# Patient Record
Sex: FEMALE | Race: BLACK OR AFRICAN AMERICAN | Employment: OTHER | URBAN - METROPOLITAN AREA
[De-identification: names, ages, dates, MRNs, and addresses within clinical notes are randomized per-mention and may not be internally consistent; named-entity substitution may affect disease eponyms.]

---

## 2017-04-19 ENCOUNTER — HOSPITAL ENCOUNTER (EMERGENCY)
Age: 52
Discharge: HOME | End: 2017-04-20
Attending: EMERGENCY MEDICINE
Payer: MEDICARE

## 2017-04-19 ENCOUNTER — APPOINTMENT (OUTPATIENT)
Dept: GENERAL RADIOLOGY | Age: 52
End: 2017-04-19
Payer: MEDICARE

## 2017-04-19 VITALS
DIASTOLIC BLOOD PRESSURE: 75 MMHG | RESPIRATION RATE: 18 BRPM | OXYGEN SATURATION: 96 % | SYSTOLIC BLOOD PRESSURE: 114 MMHG | HEART RATE: 88 BPM | TEMPERATURE: 97 F

## 2017-04-19 DIAGNOSIS — T50.901A OVERDOSE, ACCIDENTAL OR UNINTENTIONAL, INITIAL ENCOUNTER: Primary | ICD-10-CM

## 2017-04-19 LAB
ABSOLUTE EOS #: 0 K/UL (ref 0–0.4)
ABSOLUTE LYMPH #: 1.9 K/UL (ref 1–4.8)
ABSOLUTE MONO #: 0.4 K/UL (ref 0.1–1.2)
ACETAMINOPHEN LEVEL: <10 UG/ML (ref 10–30)
ANION GAP SERPL CALCULATED.3IONS-SCNC: 15 MMOL/L (ref 9–17)
BASOPHILS # BLD: 1 % (ref 0–2)
BASOPHILS ABSOLUTE: 0.1 K/UL (ref 0–0.2)
BUN BLDV-MCNC: 10 MG/DL (ref 6–20)
BUN/CREAT BLD: NORMAL (ref 9–20)
CALCIUM SERPL-MCNC: 8.7 MG/DL (ref 8.6–10.4)
CHLORIDE BLD-SCNC: 103 MMOL/L (ref 98–107)
CO2: 21 MMOL/L (ref 20–31)
CREAT SERPL-MCNC: 0.89 MG/DL (ref 0.5–0.9)
D-DIMER QUANTITATIVE: 0.34 MG/L FEU
DIFFERENTIAL TYPE: ABNORMAL
EOSINOPHILS RELATIVE PERCENT: 1 % (ref 1–4)
ETHANOL PERCENT: 0.21 %
ETHANOL: 207 MG/DL
GFR AFRICAN AMERICAN: >60 ML/MIN
GFR NON-AFRICAN AMERICAN: >60 ML/MIN
GFR SERPL CREATININE-BSD FRML MDRD: NORMAL ML/MIN/{1.73_M2}
GFR SERPL CREATININE-BSD FRML MDRD: NORMAL ML/MIN/{1.73_M2}
GLUCOSE BLD-MCNC: 95 MG/DL (ref 70–99)
HCT VFR BLD CALC: 37.2 % (ref 36–46)
HEMOGLOBIN: 12.4 G/DL (ref 12–16)
LYMPHOCYTES # BLD: 38 % (ref 24–44)
MAGNESIUM: 2.1 MG/DL (ref 1.6–2.6)
MCH RBC QN AUTO: 27.4 PG (ref 26–34)
MCHC RBC AUTO-ENTMCNC: 33.4 G/DL (ref 31–37)
MCV RBC AUTO: 81.9 FL (ref 80–100)
MONOCYTES # BLD: 8 % (ref 2–11)
PDW BLD-RTO: 16 % (ref 12.5–15.4)
PLATELET # BLD: 169 K/UL (ref 140–450)
PLATELET ESTIMATE: ABNORMAL
PMV BLD AUTO: 8.5 FL (ref 6–12)
POC TROPONIN I: 0.01 NG/ML (ref 0–0.1)
POC TROPONIN INTERP: NORMAL
POTASSIUM SERPL-SCNC: 3.8 MMOL/L (ref 3.7–5.3)
RBC # BLD: 4.54 M/UL (ref 4–5.2)
RBC # BLD: ABNORMAL 10*6/UL
SALICYLATE LEVEL: <1 MG/DL (ref 3–10)
SEG NEUTROPHILS: 52 % (ref 36–66)
SEGMENTED NEUTROPHILS ABSOLUTE COUNT: 2.6 K/UL (ref 1.8–7.7)
SODIUM BLD-SCNC: 139 MMOL/L (ref 135–144)
TOXIC TRICYCLIC SC,BLOOD: NEGATIVE
WBC # BLD: 5 K/UL (ref 3.5–11)
WBC # BLD: ABNORMAL 10*3/UL

## 2017-04-19 PROCEDURE — 84484 ASSAY OF TROPONIN QUANT: CPT

## 2017-04-19 PROCEDURE — 99285 EMERGENCY DEPT VISIT HI MDM: CPT

## 2017-04-19 PROCEDURE — 85379 FIBRIN DEGRADATION QUANT: CPT

## 2017-04-19 PROCEDURE — G0480 DRUG TEST DEF 1-7 CLASSES: HCPCS

## 2017-04-19 PROCEDURE — 6360000002 HC RX W HCPCS: Performed by: EMERGENCY MEDICINE

## 2017-04-19 PROCEDURE — 83735 ASSAY OF MAGNESIUM: CPT

## 2017-04-19 PROCEDURE — 71020 XR CHEST STANDARD TWO VW: CPT

## 2017-04-19 PROCEDURE — 96374 THER/PROPH/DIAG INJ IV PUSH: CPT

## 2017-04-19 PROCEDURE — 93005 ELECTROCARDIOGRAM TRACING: CPT

## 2017-04-19 PROCEDURE — 85025 COMPLETE CBC W/AUTO DIFF WBC: CPT

## 2017-04-19 PROCEDURE — 80307 DRUG TEST PRSMV CHEM ANLYZR: CPT

## 2017-04-19 PROCEDURE — 80048 BASIC METABOLIC PNL TOTAL CA: CPT

## 2017-04-19 RX ORDER — ONDANSETRON 2 MG/ML
4 INJECTION INTRAMUSCULAR; INTRAVENOUS ONCE
Status: COMPLETED | OUTPATIENT
Start: 2017-04-19 | End: 2017-04-19

## 2017-04-19 RX ADMIN — ONDANSETRON 4 MG: 2 INJECTION, SOLUTION INTRAMUSCULAR; INTRAVENOUS at 22:26

## 2017-04-20 LAB
EKG ATRIAL RATE: 95 BPM
EKG P AXIS: 76 DEGREES
EKG P-R INTERVAL: 164 MS
EKG Q-T INTERVAL: 360 MS
EKG QRS DURATION: 86 MS
EKG QTC CALCULATION (BAZETT): 452 MS
EKG R AXIS: 60 DEGREES
EKG T AXIS: 55 DEGREES
EKG VENTRICULAR RATE: 95 BPM
POC TROPONIN I: 0 NG/ML (ref 0–0.1)
POC TROPONIN INTERP: NORMAL

## 2017-04-20 PROCEDURE — 84484 ASSAY OF TROPONIN QUANT: CPT

## 2017-04-20 ASSESSMENT — ENCOUNTER SYMPTOMS
NAUSEA: 0
COUGH: 0
VOMITING: 0
TROUBLE SWALLOWING: 0
SHORTNESS OF BREATH: 0
DIARRHEA: 0
SORE THROAT: 0
COLOR CHANGE: 0
ABDOMINAL PAIN: 0

## 2018-11-18 ENCOUNTER — HOSPITAL ENCOUNTER (EMERGENCY)
Age: 53
Discharge: HOME | End: 2018-11-18
Attending: EMERGENCY MEDICINE
Payer: MEDICAID

## 2018-11-18 ENCOUNTER — APPOINTMENT (OUTPATIENT)
Dept: CT IMAGING | Age: 53
End: 2018-11-18
Payer: MEDICAID

## 2018-11-18 ENCOUNTER — APPOINTMENT (OUTPATIENT)
Dept: GENERAL RADIOLOGY | Age: 53
End: 2018-11-18
Payer: MEDICAID

## 2018-11-18 VITALS
OXYGEN SATURATION: 95 % | BODY MASS INDEX: 25.1 KG/M2 | DIASTOLIC BLOOD PRESSURE: 109 MMHG | RESPIRATION RATE: 20 BRPM | TEMPERATURE: 97.8 F | SYSTOLIC BLOOD PRESSURE: 170 MMHG | HEART RATE: 79 BPM | HEIGHT: 64 IN | WEIGHT: 147 LBS

## 2018-11-18 DIAGNOSIS — S20.211A RIB CONTUSION, RIGHT, INITIAL ENCOUNTER: Primary | ICD-10-CM

## 2018-11-18 PROCEDURE — 6360000002 HC RX W HCPCS: Performed by: STUDENT IN AN ORGANIZED HEALTH CARE EDUCATION/TRAINING PROGRAM

## 2018-11-18 PROCEDURE — 6360000004 HC RX CONTRAST MEDICATION: Performed by: EMERGENCY MEDICINE

## 2018-11-18 PROCEDURE — 99285 EMERGENCY DEPT VISIT HI MDM: CPT

## 2018-11-18 PROCEDURE — 71260 CT THORAX DX C+: CPT

## 2018-11-18 PROCEDURE — 6360000002 HC RX W HCPCS: Performed by: EMERGENCY MEDICINE

## 2018-11-18 PROCEDURE — 96374 THER/PROPH/DIAG INJ IV PUSH: CPT

## 2018-11-18 PROCEDURE — 71046 X-RAY EXAM CHEST 2 VIEWS: CPT

## 2018-11-18 PROCEDURE — 96375 TX/PRO/DX INJ NEW DRUG ADDON: CPT

## 2018-11-18 PROCEDURE — 6370000000 HC RX 637 (ALT 250 FOR IP): Performed by: EMERGENCY MEDICINE

## 2018-11-18 RX ORDER — HYDROCODONE BITARTRATE AND ACETAMINOPHEN 5; 325 MG/1; MG/1
1 TABLET ORAL EVERY 6 HOURS PRN
Qty: 10 TABLET | Refills: 0 | Status: SHIPPED | OUTPATIENT
Start: 2018-11-18 | End: 2018-11-21

## 2018-11-18 RX ORDER — FENTANYL CITRATE 50 UG/ML
50 INJECTION, SOLUTION INTRAMUSCULAR; INTRAVENOUS ONCE
Status: COMPLETED | OUTPATIENT
Start: 2018-11-18 | End: 2018-11-18

## 2018-11-18 RX ORDER — LIDOCAINE 50 MG/G
1 PATCH TOPICAL DAILY
Qty: 30 PATCH | Refills: 0 | Status: SHIPPED | OUTPATIENT
Start: 2018-11-18 | End: 2018-12-18

## 2018-11-18 RX ORDER — KETOROLAC TROMETHAMINE 30 MG/ML
30 INJECTION, SOLUTION INTRAMUSCULAR; INTRAVENOUS ONCE
Status: COMPLETED | OUTPATIENT
Start: 2018-11-18 | End: 2018-11-18

## 2018-11-18 RX ORDER — LIDOCAINE 4 G/G
1 PATCH TOPICAL DAILY
Status: DISCONTINUED | OUTPATIENT
Start: 2018-11-18 | End: 2018-11-18 | Stop reason: HOSPADM

## 2018-11-18 RX ORDER — KETOROLAC TROMETHAMINE 10 MG/1
10 TABLET, FILM COATED ORAL EVERY 6 HOURS PRN
Qty: 20 TABLET | Refills: 0 | Status: SHIPPED | OUTPATIENT
Start: 2018-11-18 | End: 2019-03-26 | Stop reason: ALTCHOICE

## 2018-11-18 RX ADMIN — KETOROLAC TROMETHAMINE 30 MG: 30 INJECTION, SOLUTION INTRAMUSCULAR at 09:26

## 2018-11-18 RX ADMIN — IOPAMIDOL 75 ML: 755 INJECTION, SOLUTION INTRAVENOUS at 07:14

## 2018-11-18 RX ADMIN — FENTANYL CITRATE 50 MCG: 50 INJECTION, SOLUTION INTRAMUSCULAR; INTRAVENOUS at 06:34

## 2018-11-18 ASSESSMENT — PAIN DESCRIPTION - ORIENTATION: ORIENTATION: RIGHT

## 2018-11-18 ASSESSMENT — ENCOUNTER SYMPTOMS
COLOR CHANGE: 0
SHORTNESS OF BREATH: 1
NAUSEA: 0
TROUBLE SWALLOWING: 0
CONSTIPATION: 0
PHOTOPHOBIA: 0
CHEST TIGHTNESS: 1
VOMITING: 0
COUGH: 0
DIARRHEA: 0
ABDOMINAL PAIN: 0

## 2018-11-18 ASSESSMENT — PAIN SCALES - GENERAL
PAINLEVEL_OUTOF10: 10

## 2018-11-18 ASSESSMENT — PAIN DESCRIPTION - LOCATION: LOCATION: RIB CAGE

## 2018-11-18 ASSESSMENT — PAIN DESCRIPTION - PAIN TYPE: TYPE: ACUTE PAIN
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
## 2018-11-18 NOTE — ED NOTES
Upon d/c pt still concerned right sided rib pain. Pt states pain is worse and hard to breathe. Pt was 85% on RA, and placed on 3 liters NC.  Crepitus noted to right side of rib cage      Cloteal NOMAN Callahan  11/18/18 0658
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
## 2019-03-26 ENCOUNTER — OFFICE VISIT (OUTPATIENT)
Dept: INTERNAL MEDICINE | Age: 54
End: 2019-03-26
Payer: MEDICAID

## 2019-03-26 VITALS
BODY MASS INDEX: 21.85 KG/M2 | HEIGHT: 64 IN | DIASTOLIC BLOOD PRESSURE: 84 MMHG | HEART RATE: 89 BPM | WEIGHT: 128 LBS | SYSTOLIC BLOOD PRESSURE: 139 MMHG

## 2019-03-26 DIAGNOSIS — Z12.39 BREAST CANCER SCREENING: ICD-10-CM

## 2019-03-26 DIAGNOSIS — J43.2 CENTRILOBULAR EMPHYSEMA (HCC): ICD-10-CM

## 2019-03-26 DIAGNOSIS — F17.200 SMOKER: ICD-10-CM

## 2019-03-26 DIAGNOSIS — J30.89 SEASONAL ALLERGIC RHINITIS DUE TO OTHER ALLERGIC TRIGGER: ICD-10-CM

## 2019-03-26 DIAGNOSIS — Z00.00 ANNUAL PHYSICAL EXAM: Primary | ICD-10-CM

## 2019-03-26 DIAGNOSIS — Z12.11 COLON CANCER SCREENING: ICD-10-CM

## 2019-03-26 DIAGNOSIS — Z23 NEED FOR 23-POLYVALENT PNEUMOCOCCAL POLYSACCHARIDE VACCINE: ICD-10-CM

## 2019-03-26 PROCEDURE — 96160 PT-FOCUSED HLTH RISK ASSMT: CPT | Performed by: INTERNAL MEDICINE

## 2019-03-26 PROCEDURE — 99211 OFF/OP EST MAY X REQ PHY/QHP: CPT | Performed by: INTERNAL MEDICINE

## 2019-03-26 PROCEDURE — 99386 PREV VISIT NEW AGE 40-64: CPT | Performed by: INTERNAL MEDICINE

## 2019-03-26 PROCEDURE — 90732 PPSV23 VACC 2 YRS+ SUBQ/IM: CPT | Performed by: INTERNAL MEDICINE

## 2019-03-26 PROCEDURE — G8484 FLU IMMUNIZE NO ADMIN: HCPCS | Performed by: INTERNAL MEDICINE

## 2019-03-26 RX ORDER — LORATADINE 10 MG/1
10 TABLET ORAL DAILY
Qty: 30 TABLET | Refills: 2 | Status: SHIPPED | OUTPATIENT
Start: 2019-03-26 | End: 2019-10-01 | Stop reason: SDUPTHER

## 2019-03-26 RX ORDER — FLUTICASONE PROPIONATE 50 MCG
1 SPRAY, SUSPENSION (ML) NASAL DAILY
Qty: 2 BOTTLE | Refills: 1 | Status: SHIPPED | OUTPATIENT
Start: 2019-03-26 | End: 2019-10-01 | Stop reason: SDUPTHER

## 2019-03-26 ASSESSMENT — PATIENT HEALTH QUESTIONNAIRE - PHQ9
3. TROUBLE FALLING OR STAYING ASLEEP: 3
2. FEELING DOWN, DEPRESSED OR HOPELESS: 2
SUM OF ALL RESPONSES TO PHQ9 QUESTIONS 1 & 2: 4
SUM OF ALL RESPONSES TO PHQ QUESTIONS 1-9: 16
6. FEELING BAD ABOUT YOURSELF - OR THAT YOU ARE A FAILURE OR HAVE LET YOURSELF OR YOUR FAMILY DOWN: 0
1. LITTLE INTEREST OR PLEASURE IN DOING THINGS: 2
7. TROUBLE CONCENTRATING ON THINGS, SUCH AS READING THE NEWSPAPER OR WATCHING TELEVISION: 2
4. FEELING TIRED OR HAVING LITTLE ENERGY: 2
8. MOVING OR SPEAKING SO SLOWLY THAT OTHER PEOPLE COULD HAVE NOTICED. OR THE OPPOSITE, BEING SO FIGETY OR RESTLESS THAT YOU HAVE BEEN MOVING AROUND A LOT MORE THAN USUAL: 2
5. POOR APPETITE OR OVEREATING: 3
SUM OF ALL RESPONSES TO PHQ QUESTIONS 1-9: 16
9. THOUGHTS THAT YOU WOULD BE BETTER OFF DEAD, OR OF HURTING YOURSELF: 0
10. IF YOU CHECKED OFF ANY PROBLEMS, HOW DIFFICULT HAVE THESE PROBLEMS MADE IT FOR YOU TO DO YOUR WORK, TAKE CARE OF THINGS AT HOME, OR GET ALONG WITH OTHER PEOPLE: 1

## 2019-03-26 ASSESSMENT — ENCOUNTER SYMPTOMS
CONSTIPATION: 1
BLOOD IN STOOL: 0
SINUS PAIN: 0
SINUS PRESSURE: 0
BACK PAIN: 1
WHEEZING: 0
NAUSEA: 0
COUGH: 0
SHORTNESS OF BREATH: 1
RHINORRHEA: 1
ABDOMINAL PAIN: 0

## 2019-04-18 ENCOUNTER — HOSPITAL ENCOUNTER (OUTPATIENT)
Dept: MAMMOGRAPHY | Age: 54
Discharge: HOME | End: 2019-04-20
Payer: MEDICAID

## 2019-04-18 ENCOUNTER — HOSPITAL ENCOUNTER (OUTPATIENT)
Dept: NEUROLOGY | Age: 54
Discharge: HOME | End: 2019-04-18
Payer: MEDICAID

## 2019-04-18 DIAGNOSIS — Z12.39 BREAST CANCER SCREENING: ICD-10-CM

## 2019-04-18 DIAGNOSIS — J43.2 CENTRILOBULAR EMPHYSEMA (HCC): ICD-10-CM

## 2019-04-18 PROCEDURE — 77063 BREAST TOMOSYNTHESIS BI: CPT

## 2019-04-18 PROCEDURE — 94727 GAS DIL/WSHOT DETER LNG VOL: CPT

## 2019-04-18 PROCEDURE — 94729 DIFFUSING CAPACITY: CPT

## 2019-04-18 PROCEDURE — 6370000000 HC RX 637 (ALT 250 FOR IP): Performed by: INTERNAL MEDICINE

## 2019-04-18 PROCEDURE — 94664 DEMO&/EVAL PT USE INHALER: CPT

## 2019-04-18 PROCEDURE — 94060 EVALUATION OF WHEEZING: CPT

## 2019-04-18 RX ORDER — ALBUTEROL SULFATE 90 UG/1
2 AEROSOL, METERED RESPIRATORY (INHALATION) ONCE
Status: COMPLETED | OUTPATIENT
Start: 2019-04-18 | End: 2019-04-18

## 2019-04-18 RX ADMIN — ALBUTEROL SULFATE 2 PUFF: 90 AEROSOL, METERED RESPIRATORY (INHALATION) at 16:23

## 2019-04-19 NOTE — PROCEDURES
[address] [facility] [address]                [address] [place] [address]                               PULMONARY FUNCTION    PATIENT NAME: Alejandra Mora                  :        1965  MED REC NO:   [id number]                             ROOM:  ACCOUNT NO:   [de-identified]                           ADMIT DATE: 2019  PROVIDER:     Nevaeh Lopez    DATE OF PROCEDURE:  2019    TYPE OF STUDY:  Complete PFT with bronchodilator testing. RESULTS:  The patient had an FEV1 of 0.94, which is severely reduced at  41% predicted. The FVC was 2.23, which is mildly reduced at 78%  predicted. FEV1/FVC was reduced at 42. The mid flows were severely  reduced at 16% predicted. The residual volume was increased at 181%  predicted. Total lung capacity was increased at 119% predicted. Diffusion was moderately reduced at 64% predicted. Following  bronchodilators, there was significant improvement in the flows. IMPRESSION:  Severe obstructive pulmonary disease with air trapping,  hyperinflation, and moderate diffusion impairment. There is significant  improvement in the flows following bronchodilators. Clinical  correlation is recommended.         Warren Russell    D: 2019 13:03:27       T: 2019 14:17:02     [email or web address]  Job#: [id number]     Doc#: [id number]    CC:

## 2019-04-23 ENCOUNTER — TELEPHONE (OUTPATIENT)
Dept: INTERNAL MEDICINE | Age: 54
End: 2019-04-23

## 2019-04-23 DIAGNOSIS — J44.9 COPD, SEVERE (HCC): Primary | ICD-10-CM

## 2019-04-23 NOTE — TELEPHONE ENCOUNTER
Pt calling requesting the results of her mammogram and PFTs. Pt states that respiratory gave her a device for an inhaler and told her to contact the office for an inhaler.       Health Maintenance   Topic Date Due    Hepatitis C screen  1965    HIV screen  08/08/1979    Lipid screen  08/08/2004    Shingles Vaccine (1 of 2) 08/08/2014    Colon cancer screen colonoscopy  08/08/2014    Cervical cancer screen  09/24/2017    Flu vaccine (Season Ended) 09/05/2019 (Originally 9/1/2019)    Breast cancer screen  04/18/2021    DTaP/Tdap/Td vaccine (2 - Td) 11/16/2024    Pneumococcal 0-64 years Vaccine  Completed             (applicable per patient's age: Cancer Screenings, Depression Screening, Fall Risk Screening, Immunizations)    BUN (mg/dL)   Date Value   04/19/2017 10      (goal A1C is < 7)   (goal LDL is <100) need 30-50% reduction from baseline     BP Readings from Last 3 Encounters:   03/26/19 139/84   11/18/18 (!) 170/109   04/19/17 114/75    (goal /80)      All Future Testing planned in [facility]:  Lab Frequency Next Occurrence   Lipid Panel Once 07/04/2019   Hepatitis C Antibody Once 07/03/2019   HIV Screen Once 07/03/2019   CBC With Auto Differential Once 07/03/2019   Comprehensive Metabolic Panel Once 07/44/6339   TSH with Reflex Once 06/27/2019       Next Visit Date:  Future Appointments   Date Time Provider Horacio Salinas   5/20/2019 11:00 AM Jonatan Villatoro MD [address] [place] [place] [address]            Patient Active Problem List:     Tobacco abuse     Allergic rhinitis

## 2019-04-24 RX ORDER — ALBUTEROL SULFATE 90 UG/1
2 AEROSOL, METERED RESPIRATORY (INHALATION) EVERY 6 HOURS PRN
Qty: 1 INHALER | Refills: 3 | Status: SHIPPED | OUTPATIENT
Start: 2019-04-24 | End: 2020-04-03 | Stop reason: SDUPTHER

## 2019-04-25 NOTE — TELEPHONE ENCOUNTER
MARION for patient to call the office for a message from md.  Patient had called about her mammogram results and I see she is scheduled for a breast ultrasound

## 2019-04-26 ENCOUNTER — HOSPITAL ENCOUNTER (OUTPATIENT)
Dept: ULTRASOUND IMAGING | Age: 54
Discharge: HOME | End: 2019-04-28
Payer: MEDICAID

## 2019-04-26 DIAGNOSIS — R92.8 ABNORMAL MAMMOGRAM: ICD-10-CM

## 2019-04-26 PROCEDURE — 76642 ULTRASOUND BREAST LIMITED: CPT

## 2019-04-30 ENCOUNTER — TELEPHONE (OUTPATIENT)
Dept: INTERNAL MEDICINE | Age: 54
End: 2019-04-30

## 2019-04-30 DIAGNOSIS — J44.9 COPD, SEVERE (HCC): Primary | ICD-10-CM

## 2019-04-30 NOTE — TELEPHONE ENCOUNTER
PA request received for Spiriva. This is non-formulary. Pt must try/fail/have contraindication to formulary agent(s):incruse Ellipta    Please dc non formulary medication and order the preferred medication if appropriate. Thanks!

## 2019-05-02 ENCOUNTER — OFFICE VISIT (OUTPATIENT)
Dept: INTERNAL MEDICINE | Age: 54
End: 2019-05-02
Payer: MEDICAID

## 2019-05-02 VITALS
HEART RATE: 59 BPM | HEIGHT: 64 IN | DIASTOLIC BLOOD PRESSURE: 104 MMHG | SYSTOLIC BLOOD PRESSURE: 158 MMHG | BODY MASS INDEX: 22.09 KG/M2 | WEIGHT: 129.4 LBS

## 2019-05-02 DIAGNOSIS — G56.02 CARPAL TUNNEL SYNDROME OF LEFT WRIST: Primary | ICD-10-CM

## 2019-05-02 DIAGNOSIS — Z72.0 TOBACCO ABUSE: ICD-10-CM

## 2019-05-02 DIAGNOSIS — I10 ESSENTIAL HYPERTENSION: ICD-10-CM

## 2019-05-02 DIAGNOSIS — J43.2 CENTRILOBULAR EMPHYSEMA (HCC): ICD-10-CM

## 2019-05-02 DIAGNOSIS — G43.709 CHRONIC MIGRAINE WITHOUT AURA WITHOUT STATUS MIGRAINOSUS, NOT INTRACTABLE: ICD-10-CM

## 2019-05-02 DIAGNOSIS — Z12.11 SCREENING FOR COLON CANCER: ICD-10-CM

## 2019-05-02 PROCEDURE — G8427 DOCREV CUR MEDS BY ELIG CLIN: HCPCS | Performed by: STUDENT IN AN ORGANIZED HEALTH CARE EDUCATION/TRAINING PROGRAM

## 2019-05-02 PROCEDURE — 99211 OFF/OP EST MAY X REQ PHY/QHP: CPT | Performed by: INTERNAL MEDICINE

## 2019-05-02 PROCEDURE — 4004F PT TOBACCO SCREEN RCVD TLK: CPT | Performed by: STUDENT IN AN ORGANIZED HEALTH CARE EDUCATION/TRAINING PROGRAM

## 2019-05-02 PROCEDURE — G8420 CALC BMI NORM PARAMETERS: HCPCS | Performed by: STUDENT IN AN ORGANIZED HEALTH CARE EDUCATION/TRAINING PROGRAM

## 2019-05-02 PROCEDURE — 3017F COLORECTAL CA SCREEN DOC REV: CPT | Performed by: STUDENT IN AN ORGANIZED HEALTH CARE EDUCATION/TRAINING PROGRAM

## 2019-05-02 PROCEDURE — 3023F SPIROM DOC REV: CPT | Performed by: STUDENT IN AN ORGANIZED HEALTH CARE EDUCATION/TRAINING PROGRAM

## 2019-05-02 PROCEDURE — G8926 SPIRO NO PERF OR DOC: HCPCS | Performed by: STUDENT IN AN ORGANIZED HEALTH CARE EDUCATION/TRAINING PROGRAM

## 2019-05-02 PROCEDURE — 99213 OFFICE O/P EST LOW 20 MIN: CPT | Performed by: STUDENT IN AN ORGANIZED HEALTH CARE EDUCATION/TRAINING PROGRAM

## 2019-05-02 RX ORDER — BLOOD PRESSURE TEST KIT
1 KIT MISCELLANEOUS DAILY
Qty: 1 KIT | Refills: 0 | Status: SHIPPED | OUTPATIENT
Start: 2019-05-02

## 2019-05-02 RX ORDER — IBUPROFEN 400 MG/1
600 TABLET ORAL EVERY 8 HOURS PRN
Qty: 20 TABLET | Refills: 0 | Status: SHIPPED | OUTPATIENT
Start: 2019-05-02 | End: 2019-09-10 | Stop reason: SDUPTHER

## 2019-05-02 RX ORDER — HYDROCHLOROTHIAZIDE 12.5 MG/1
25 TABLET ORAL EVERY MORNING
Qty: 30 TABLET | Refills: 0 | Status: CANCELLED | OUTPATIENT
Start: 2019-05-02

## 2019-05-02 RX ORDER — BLOOD PRESSURE TEST KIT
KIT MISCELLANEOUS
Qty: 1 KIT | Refills: 0 | Status: CANCELLED | OUTPATIENT
Start: 2019-05-02
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
## 2019-05-02 NOTE — PROGRESS NOTES
Attending Physician Statement GE  I have discussed the care of Antionette Phillips, including pertinent history and exam findings with the resident. I have reviewed the key elements of all parts of the encounter with the resident. Left CTS-needs script for Wrist splint  Hypertension- BP has good home in the past  DASH. BP monitor. Reevaluate in 2-3 months  COPD- Incruse, Albuterol. Continue nicotine patches for smoking cessation  cologuard  Reprint labs ordered previously    Return for as scheduled for PAP.     Vy Rodas MD

## 2019-05-02 NOTE — PATIENT INSTRUCTIONS
Patient Education        DASH Diet: Care Instructions  Your Care Instructions    The DASH diet is an eating plan that can help lower your blood pressure. DASH stands for Dietary Approaches to Stop Hypertension. Hypertension is high blood pressure. The DASH diet focuses on eating foods that are high in calcium, potassium, and magnesium. These nutrients can lower blood pressure. The foods that are highest in these nutrients are fruits, vegetables, low-fat dairy products, nuts, seeds, and legumes. But taking calcium, potassium, and magnesium supplements instead of eating foods that are high in those nutrients does not have the same effect. The DASH diet also includes whole grains, fish, and poultry. The DASH diet is one of several lifestyle changes your doctor may recommend to lower your high blood pressure. Your doctor may also want you to decrease the amount of sodium in your diet. Lowering sodium while following the DASH diet can lower blood pressure even further than just the DASH diet alone. Follow-up care is a key part of your treatment and safety. Be sure to make and go to all appointments, and call your doctor if you are having problems. It's also a good idea to know your test results and keep a list of the medicines you take. How can you care for yourself at home? Following the DASH diet  · Eat 4 to 5 servings of fruit each day. A serving is 1 medium-sized piece of fruit, ½ cup chopped or canned fruit, 1/4 cup dried fruit, or 4 ounces (½ cup) of fruit juice. Choose fruit more often than fruit juice. · Eat 4 to 5 servings of vegetables each day. A serving is 1 cup of lettuce or raw leafy vegetables, ½ cup of chopped or cooked vegetables, or 4 ounces (½ cup) of vegetable juice. Choose vegetables more often than vegetable juice. · Get 2 to 3 servings of low-fat and fat-free dairy each day. A serving is 8 ounces of milk, 1 cup of yogurt, or 1 ½ ounces of cheese. · Eat 6 to 8 servings of grains each day. A serving is 1 slice of bread, 1 ounce of dry cereal, or ½ cup of cooked rice, pasta, or cooked cereal. Try to choose whole-grain products as much as possible. · Limit lean meat, poultry, and fish to 2 servings each day. A serving is 3 ounces, about the size of a deck of cards. · Eat 4 to 5 servings of nuts, seeds, and legumes (cooked dried beans, lentils, and split peas) each week. A serving is 1/3 cup of nuts, 2 tablespoons of seeds, or ½ cup of cooked beans or peas. · Limit fats and oils to 2 to 3 servings each day. A serving is 1 teaspoon of vegetable oil or 2 tablespoons of salad dressing. · Limit sweets and added sugars to 5 servings or less a week. A serving is 1 tablespoon jelly or jam, ½ cup sorbet, or 1 cup of lemonade. · Eat less than 2,300 milligrams (mg) of sodium a day. If you limit your sodium to 1,500 mg a day, you can lower your blood pressure even more. Tips for success  · Start small. Do not try to make dramatic changes to your diet all at once. You might feel that you are missing out on your favorite foods and then be more likely to not follow the plan. Make small changes, and stick with them. Once those changes become habit, add a few more changes. · Try some of the following:  ? Make it a goal to eat a fruit or vegetable at every meal and at snacks. This will make it easy to get the recommended amount of fruits and vegetables each day. ? Try yogurt topped with fruit and nuts for a snack or healthy dessert. ? Add lettuce, tomato, cucumber, and onion to sandwiches. ? Combine a ready-made pizza crust with low-fat mozzarella cheese and lots of vegetable toppings. Try using tomatoes, squash, spinach, broccoli, carrots, cauliflower, and onions. ? Have a variety of cut-up vegetables with a low-fat dip as an appetizer instead of chips and dip. ? Sprinkle sunflower seeds or chopped almonds over salads. Or try adding chopped walnuts or almonds to cooked vegetables.   ? Try some vegetarian meals using beans and peas. Add garbanzo or kidney beans to salads. Make burritos and tacos with mashed elise beans or black beans. Where can you learn more? Go to https://sergio.[employer]. org and sign in to your [employer] account. Enter V112 in the [facility] box to learn more about \"DASH Diet: Care Instructions. \"     If you do not have an account, please click on the \"Sign Up Now\" link. Current as of: July 22, 2018  Content Version: 11.9  © 8286-9265 [employer], [employer]. Care instructions adapted under license by [facility] ([facility]). If you have questions about a medical condition or this instruction, always ask your healthcare professional. [address] any warranty or liability for your use of this information. Medications e-scribe to pharmacy of pt's choice. Return To Clinic    05/20/10. After Visit Summary  given and reviewed. --tv    It is very important for your care that you keep your appointment. If for some reason you are unable to keep your appointment it is equally important that you call our office at [phone] to cancel your appointment and reschedule. Failure to do so may result in your termination from our practice.         wrist splint script given   bp cuff script

## 2019-05-02 NOTE — PROGRESS NOTES
Visit Information    Have you changed or started any medications since your last visit including any over-the-counter medicines, vitamins, or herbal medicines? no   Have you stopped taking any of your medications? Is so, why? -  no  Are you having any side effects from any of your medications? - no    Have you seen any other physician or provider since your last visit?  no   Have you had any other diagnostic tests since your last visit?  no   Have you been seen in the emergency room and/or had an admission in a hospital since we last saw you?  no   Have you had your routine dental cleaning in the past 6 months?  no     Do you have an active MyChart account? If no, what is the barrier?   No:     Patient Care Team:  Chery Lazo MD as PCP - General (Internal Medicine)    Medical History Review  Past Medical, Family, and Social History reviewed and does contribute to the patient presenting condition    Health Maintenance   Topic Date Due    Hepatitis C screen  1965    HIV screen  08/08/1979    Lipid screen  08/08/2004    Shingles Vaccine (1 of 2) 08/08/2014    Colon cancer screen colonoscopy  08/08/2014    Cervical cancer screen  09/24/2017    Flu vaccine (Season Ended) 09/05/2019 (Originally 9/1/2019)    Breast cancer screen  04/18/2021    DTaP/Tdap/Td vaccine (2 - Td) 11/16/2024    Pneumococcal 0-64 years Vaccine  Completed

## 2019-05-06 ENCOUNTER — HOSPITAL ENCOUNTER (EMERGENCY)
Age: 54
Discharge: HOME | End: 2019-05-07
Attending: EMERGENCY MEDICINE
Payer: MEDICAID

## 2019-05-06 VITALS
TEMPERATURE: 98.2 F | HEART RATE: 87 BPM | DIASTOLIC BLOOD PRESSURE: 53 MMHG | OXYGEN SATURATION: 92 % | SYSTOLIC BLOOD PRESSURE: 102 MMHG | RESPIRATION RATE: 16 BRPM

## 2019-05-06 DIAGNOSIS — F10.920 ACUTE ALCOHOLIC INTOXICATION WITHOUT COMPLICATION (HCC): Primary | ICD-10-CM

## 2019-05-06 LAB
ABSOLUTE EOS #: <0.03 K/UL (ref 0–0.44)
ABSOLUTE IMMATURE GRANULOCYTE: 0.04 K/UL (ref 0–0.3)
ABSOLUTE LYMPH #: 1.82 K/UL (ref 1.1–3.7)
ABSOLUTE MONO #: 0.71 K/UL (ref 0.1–1.2)
ACETAMINOPHEN LEVEL: <5 UG/ML (ref 10–30)
ALBUMIN SERPL-MCNC: 4.5 G/DL (ref 3.5–5.2)
ALBUMIN/GLOBULIN RATIO: 1.4 (ref 1–2.5)
ALP BLD-CCNC: 100 U/L (ref 35–104)
ALT SERPL-CCNC: 15 U/L (ref 5–33)
ANION GAP SERPL CALCULATED.3IONS-SCNC: 21 MMOL/L (ref 9–17)
AST SERPL-CCNC: 32 U/L
BASOPHILS # BLD: 0 % (ref 0–2)
BASOPHILS ABSOLUTE: 0.05 K/UL (ref 0–0.2)
BILIRUB SERPL-MCNC: 0.3 MG/DL (ref 0.3–1.2)
BILIRUBIN DIRECT: <0.08 MG/DL
BILIRUBIN, INDIRECT: ABNORMAL MG/DL (ref 0–1)
BUN BLDV-MCNC: 22 MG/DL (ref 6–20)
BUN/CREAT BLD: ABNORMAL (ref 9–20)
CALCIUM SERPL-MCNC: 8.8 MG/DL (ref 8.6–10.4)
CHLORIDE BLD-SCNC: 100 MMOL/L (ref 98–107)
CO2: 15 MMOL/L (ref 20–31)
CREAT SERPL-MCNC: 1.02 MG/DL (ref 0.5–0.9)
DIFFERENTIAL TYPE: ABNORMAL
EOSINOPHILS RELATIVE PERCENT: 0 % (ref 1–4)
ETHANOL PERCENT: 0.24 %
ETHANOL: 240 MG/DL
GFR AFRICAN AMERICAN: >60 ML/MIN
GFR NON-AFRICAN AMERICAN: 56 ML/MIN
GFR SERPL CREATININE-BSD FRML MDRD: ABNORMAL ML/MIN/{1.73_M2}
GFR SERPL CREATININE-BSD FRML MDRD: ABNORMAL ML/MIN/{1.73_M2}
GLOBULIN: ABNORMAL G/DL (ref 1.5–3.8)
GLUCOSE BLD-MCNC: 70 MG/DL (ref 70–99)
HCT VFR BLD CALC: 40 % (ref 36.3–47.1)
HEMOGLOBIN: 12.7 G/DL (ref 11.9–15.1)
IMMATURE GRANULOCYTES: 0 %
LIPASE: 10 U/L (ref 13–60)
LYMPHOCYTES # BLD: 15 % (ref 24–43)
MCH RBC QN AUTO: 27.1 PG (ref 25.2–33.5)
MCHC RBC AUTO-ENTMCNC: 31.8 G/DL (ref 28.4–34.8)
MCV RBC AUTO: 85.3 FL (ref 82.6–102.9)
MONOCYTES # BLD: 6 % (ref 3–12)
NRBC AUTOMATED: 0 PER 100 WBC
PDW BLD-RTO: 14.7 % (ref 11.8–14.4)
PLATELET # BLD: 250 K/UL (ref 138–453)
PLATELET ESTIMATE: ABNORMAL
PMV BLD AUTO: 10.4 FL (ref 8.1–13.5)
POTASSIUM SERPL-SCNC: 3.9 MMOL/L (ref 3.7–5.3)
RBC # BLD: 4.69 M/UL (ref 3.95–5.11)
RBC # BLD: ABNORMAL 10*6/UL
SALICYLATE LEVEL: 1 MG/DL (ref 3–10)
SEG NEUTROPHILS: 79 % (ref 36–65)
SEGMENTED NEUTROPHILS ABSOLUTE COUNT: 9.44 K/UL (ref 1.5–8.1)
SODIUM BLD-SCNC: 136 MMOL/L (ref 135–144)
TOTAL PROTEIN: 7.8 G/DL (ref 6.4–8.3)
TOXIC TRICYCLIC SC,BLOOD: NEGATIVE
WBC # BLD: 12.1 K/UL (ref 3.5–11.3)
WBC # BLD: ABNORMAL 10*3/UL

## 2019-05-06 PROCEDURE — G0480 DRUG TEST DEF 1-7 CLASSES: HCPCS

## 2019-05-06 PROCEDURE — 80076 HEPATIC FUNCTION PANEL: CPT

## 2019-05-06 PROCEDURE — 6360000002 HC RX W HCPCS: Performed by: EMERGENCY MEDICINE

## 2019-05-06 PROCEDURE — 2580000003 HC RX 258: Performed by: EMERGENCY MEDICINE

## 2019-05-06 PROCEDURE — 96374 THER/PROPH/DIAG INJ IV PUSH: CPT

## 2019-05-06 PROCEDURE — 85025 COMPLETE CBC W/AUTO DIFF WBC: CPT

## 2019-05-06 PROCEDURE — 99284 EMERGENCY DEPT VISIT MOD MDM: CPT

## 2019-05-06 PROCEDURE — 80307 DRUG TEST PRSMV CHEM ANLYZR: CPT

## 2019-05-06 PROCEDURE — 80048 BASIC METABOLIC PNL TOTAL CA: CPT

## 2019-05-06 PROCEDURE — 83690 ASSAY OF LIPASE: CPT

## 2019-05-06 RX ORDER — 0.9 % SODIUM CHLORIDE 0.9 %
1000 INTRAVENOUS SOLUTION INTRAVENOUS ONCE
Status: COMPLETED | OUTPATIENT
Start: 2019-05-06 | End: 2019-05-06

## 2019-05-06 RX ORDER — ONDANSETRON 2 MG/ML
4 INJECTION INTRAMUSCULAR; INTRAVENOUS ONCE
Status: COMPLETED | OUTPATIENT
Start: 2019-05-06 | End: 2019-05-06

## 2019-05-06 RX ADMIN — ONDANSETRON 4 MG: 2 INJECTION INTRAMUSCULAR; INTRAVENOUS at 21:27

## 2019-05-06 RX ADMIN — SODIUM CHLORIDE 1000 ML: 9 INJECTION, SOLUTION INTRAVENOUS at 21:37

## 2019-05-07 NOTE — ED NOTES
Bed: 34  Expected date:   Expected time:   Means of arrival:   Comments:  Medic 6, etoh      Anthony Norris RN  05/06/19 2038

## 2019-05-07 NOTE — ED PROVIDER NOTES
family or personal history of breast cancer. FINDINGS: There are scattered areas of fibroglandular density. No suspicious asymmetries, no suspicious calcifications, no architectural distortion. 1.2 cm mass in the retroareolar left breast at 2:00. Right breast: Negative. Left breast: 1.2 cm retroareolar mass. RECOMMENDATION: Additional imaging evaluation of left breast ultrasound. BIRADS:0 BIRADS - CATEGORY 0 Additional imaging is recommended at this time. OVERALL ASSESSMENT - INCOMPLETE:NEED ADDITIONAL IMAGING EVALUATION. Us Breast Limited Left    Result Date: 4/26/2019  EXAMINATION: TARGETED ULTRASOUND OF THE LEFT BREAST 4/26/2019 COMPARISON: Mammogram dated 04/18/2019, 04/02/2015. [place] HISTORY: ORDERING SYSTEM PROVIDED HISTORY: Abnormal mammogram.  Mass in the retroareolar left breast on mammogram. FINDINGS: Mass in the retroareolar left breast seen on mammogram likely represents an [place] of dense fibroglandular tissue with no correlation on ultrasound. No solid or cystic masses in the retroareolar left breast on ultrasound. Negative. No ultrasound evidence for malignancy in the left breast. RECOMMENDATION: Annual screening mammogram. BIRADS: BIRADS CATEGORY 1 Negative, no evidence of malignancy. Normal interval follow-up is recommended in 12 months. OVERALL ASSESSMENT - NEGATIVE A letter of notification will be sent to the patient regarding the results. The [employer] recommends annual mammograms for women 40 years and older. RECENT VITALS:     Temp: 98.2 °F (36.8 °C),  Pulse: 87, Resp: 16, BP: (!) 102/53, SpO2: 92 %    This patient is a 46 y.o. Female brought in by EMS after being found in her neighbor's yard lying down and puking. Patient altered, no signs of trauma, alcohol intoxication, otherwise workup unremarkable. Sober time 3 AM      OUTSTANDING TASKS / RECOMMENDATIONS:    1. Reassess at 3 am      - at 3am patient is clinically sober and without complaints.  She is comfortable with d/c and will await transport home. FINAL IMPRESSION:     1. Acute alcoholic intoxication without complication ([facility] [address]        DISPOSITION:         DISPOSITION:  [x]  Discharge   []  Transfer -    []  Admission -     []  Against Medical Advice   []  Eloped   FOLLOW-UP: No follow-up provider specified.    DISCHARGE MEDICATIONS: New Prescriptions    No medications on file           Alaina Cortes MD  Emergency Medicine Resident  [facility]        Alaina Cortes MD  Resident  05/07/19 [id number]

## 2019-05-07 NOTE — ED PROVIDER NOTES
[address]  ED  Emergency Department Encounter  EmergencyMedicine Resident     Pt Shayy Agustin  MRN: [id number]  Birthdate 1965  Date of evaluation: 5/6/19  PCP:  Emir Hunter MD    [address] [place] [address]       Chief Complaint   Patient presents with    Alcohol Intoxication    Emesis       HISTORY OF PRESENT ILLNESS  (Location/Symptom, Timing/Onset, Context/Setting, Quality, Duration, Modifying Factors, Severity.)      Drew Hill is a 46 y.o. female who is brought in by EMS after found lying in her neighbor's yard, altered and with nausea and vomiting. EMS states the patient admits alcohol use although patient not answering questions at this time. PAST MEDICAL / SURGICAL / SOCIAL / FAMILY HISTORY      has a past medical history of Allergic rhinitis, Centrilobular emphysema ([facility] [address] and Tobacco abuse.     has a past surgical history that includes Tubal ligation. Social History     Socioeconomic History    Marital status: Single     Spouse name: Not on file    Number of children: Not on file    Years of education: Not on file    Highest education level: Not on file   Occupational History    Not on file   Social Needs    Financial resource strain: Not on file    Food insecurity:     Worry: Not on file     Inability: Not on file    Transportation needs:     Medical: Not on file     Non-medical: Not on file   Tobacco Use    Smoking status: Current Every Day Smoker     Packs/day: 1.00     Years: 35.00     Pack years: 35.00     Types: Cigarettes    Smokeless tobacco: Never Used   Substance and Sexual Activity    Alcohol use:  Yes     Alcohol/week: 3.6 oz     Types: 6 Cans of beer per week    Drug use: Yes     Types: Marijuana    Sexual activity: Not on file   Lifestyle    Physical activity:     Days per week: Not on file     Minutes per session: Not on file    Stress: Not on file   Relationships    Social connections:     Talks on phone: Not on file     Gets together: Resp 16   LMP 04/12/2015 (Approximate)   SpO2 92%     Physical Exam   Constitutional: She appears well-developed and well-nourished. HENT:   Head: Normocephalic and atraumatic. Mouth/Throat: Oropharynx is clear and moist.   No signs of trauma to the scalp or face   Eyes: Pupils are equal, round, and reactive to light. Conjunctivae are normal.   Neck: Normal range of motion. Neck supple. Cardiovascular: Normal rate and regular rhythm. Exam reveals no gallop and no friction rub. No murmur heard. Pulmonary/Chest: Effort normal and breath sounds normal. No respiratory distress. She has no wheezes. She has no rales. Abdominal: Soft. Bowel sounds are normal. There is no tenderness. There is no rebound and no guarding. Musculoskeletal: Normal range of motion. She exhibits no edema. Neurological: She is alert. She has normal reflexes. Patient alert, moving all extremities, stable gait, slurring speech   Skin: Skin is warm and dry. No rash noted. Psychiatric: She has a normal mood and affect. Thought content normal.   Nursing note and vitals reviewed.       DIFFERENTIAL  DIAGNOSIS     PLAN (LABS / IMAGING / EKG):  Orders Placed This Encounter   Procedures    TOX SCR, BLD, ED    LIPASE    HEPATIC FUNCTION PANEL    CBC WITH AUTO DIFFERENTIAL    BASIC METABOLIC PANEL       MEDICATIONS ORDERED:  Orders Placed This Encounter   Medications    ondansetron (ZOFRAN) injection 4 mg    0.9 % sodium chloride bolus       DDX: Alcohol intoxication, pancreatitis, hepatitis, cirrhosis, dehydration, drug ingestion    DIAGNOSTIC RESULTS / EMERGENCY DEPARTMENT COURSE / MDM     LABS:  Results for orders placed or performed during the hospital encounter of 05/06/19   TOX SCR, BLD, ED   Result Value Ref Range    Ethanol 240 (H) <10 mg/dL    Ethanol percent 0.240 (H) <5.056 %    Salicylate Lvl 1 (L) 3 - 10 mg/dL    Acetaminophen Level <5 (L) 10 - 30 ug/mL    Toxic Tricyclic Sc,Blood NEGATIVE NEGATIVE   LIPASE   Result Value Ref Range    Lipase 10 (L) 13 - 60 U/L   HEPATIC FUNCTION PANEL   Result Value Ref Range    Alb 4.5 3.5 - 5.2 g/dL    Alkaline Phosphatase 100 35 - 104 U/L    ALT 15 5 - 33 U/L    AST 32 (H) <32 U/L    Total Bilirubin 0.30 0.3 - 1.2 mg/dL    Bilirubin, Direct <0.08 <0.31 mg/dL    Bilirubin, Indirect CANNOT BE CALCULATED 0.00 - 1.00 mg/dL    Total Protein 7.8 6.4 - 8.3 g/dL    Globulin NOT REPORTED 1.5 - 3.8 g/dL    Albumin/Globulin Ratio 1.4 1.0 - 2.5   CBC WITH AUTO DIFFERENTIAL   Result Value Ref Range    WBC 12.1 (H) 3.5 - 11.3 k/uL    RBC 4.69 3.95 - 5.11 m/uL    Hemoglobin 12.7 11.9 - 15.1 g/dL    Hematocrit 40.0 36.3 - 47.1 %    MCV 85.3 82.6 - 102.9 fL    MCH 27.1 25.2 - 33.5 pg    MCHC 31.8 28.4 - 34.8 g/dL    RDW 14.7 (H) 11.8 - 14.4 %    Platelets 085 344 - 410 k/uL    MPV 10.4 8.1 - 13.5 fL    NRBC Automated 0.0 0.0 per 100 WBC    Differential Type NOT REPORTED     Seg Neutrophils 79 (H) 36 - 65 %    Lymphocytes 15 (L) 24 - 43 %    Monocytes 6 3 - 12 %    Eosinophils % 0 (L) 1 - 4 %    Basophils 0 0 - 2 %    Immature Granulocytes 0 0 %    Segs Absolute 9.44 (H) 1.50 - 8.10 k/uL    Absolute Lymph # 1.82 1.10 - 3.70 k/uL    Absolute Mono # 0.71 0.10 - 1.20 k/uL    Absolute Eos # <0.03 0.00 - 0.44 k/uL    Basophils # 0.05 0.00 - 0.20 k/uL    Absolute Immature Granulocyte 0.04 0.00 - 0.30 k/uL    WBC Morphology NOT REPORTED     RBC Morphology ANISOCYTOSIS PRESENT     Platelet Estimate NOT REPORTED    BASIC METABOLIC PANEL   Result Value Ref Range    Glucose 70 70 - 99 mg/dL    BUN 22 (H) 6 - 20 mg/dL    CREATININE 1.02 (H) 0.50 - 0.90 mg/dL    Bun/Cre Ratio NOT REPORTED 9 - 20    Calcium 8.8 8.6 - 10.4 mg/dL    Sodium 136 135 - 144 mmol/L    Potassium 3.9 3.7 - 5.3 mmol/L    Chloride 100 98 - 107 mmol/L    CO2 15 (L) 20 - 31 mmol/L    Anion Gap 21 (H) 9 - 17 mmol/L    GFR Non-African American 56 (L) >60 mL/min    GFR African American >60 >60 mL/min    GFR Comment          GFR Staging NOT REPORTED

## 2019-05-07 NOTE — ED NOTES
Pt awake, alert, coherent , acting appropriate and oriented, ready to go home.  Able to tolerate fluids,     Marquez Govea RN  05/07/19 [id number]

## 2019-05-07 NOTE — ED PROVIDER NOTES
[address] [facility]     Emergency Department     Faculty Attestation    I performed a history and physical examination of the patient and discussed management with the resident. I reviewed the residents note and agree with the documented findings and plan of care. Any areas of disagreement are noted on the chart. I was personally present for the key portions of any procedures. I have documented in the chart those procedures where I was not present during the key portions. I have reviewed the emergency nurses triage note. I agree with the chief complaint, past medical history, past surgical history, allergies, medications, social and family history as documented unless otherwise noted below. For Physician Assistant/ Nurse Practitioner cases/documentation I have personally evaluated this patient and have completed at least one if not all key elements of the E/M (history, physical exam, and MDM). Additional findings are as noted. I have personally seen and evaluated the patient. I find the patient's history and physical exam are consistent with the NP/PA documentation. I agree with the care provided, treatment rendered, disposition and follow-up plan. HPI:Found in neighbor's yard, intoxicated. denies trauma. Confused. No other complaints. Exam:  Awake, alert. Oriented to person only  No signs of trauma      MDM/ED course:  ED tox - ETOH level of 0.24. No other significant lab abnormalities (slight leukocytosis, creatinine at baseline). Sober at 4am    This patient was signed out to Dr. Bender. Please see their note for the remainder of this patient's care.        Critical Care    Dontrell Abdi MD   Attending Emergency  Physician              Dontrell Abdi MD  05/07/19 [id number]

## 2019-05-07 NOTE — ED PROVIDER NOTES
[address] [facility]  Emergency Department  Faculty Sign-Out Addendum     Care of Evern Stage was assumed from previous attending and is being seen for Alcohol Intoxication and Emesis  . The patient's initial evaluation and plan have been discussed with the prior provider who initially evaluated the patient. EMERGENCY DEPARTMENT COURSE / MEDICAL DECISION MAKING:       MEDICATIONS GIVEN:  Orders Placed This Encounter   Medications    ondansetron (ZOFRAN) injection 4 mg    0.9 % sodium chloride bolus       LABS / RADIOLOGY:     Labs Reviewed   TOX SCR, BLD, ED - Abnormal; Notable for the following components:       Result Value    Ethanol 240 (*)     Ethanol percent 1.774 (*)     Salicylate Lvl 1 (*)     Acetaminophen Level <5 (*)     All other components within normal limits   LIPASE - Abnormal; Notable for the following components:    Lipase 10 (*)     All other components within normal limits   HEPATIC FUNCTION PANEL - Abnormal; Notable for the following components:    AST 32 (*)     All other components within normal limits   CBC WITH AUTO DIFFERENTIAL - Abnormal; Notable for the following components:    WBC 12.1 (*)     RDW 14.7 (*)     Seg Neutrophils 79 (*)     Lymphocytes 15 (*)     Eosinophils % 0 (*)     Segs Absolute 9.44 (*)     All other components within normal limits   BASIC METABOLIC PANEL - Abnormal; Notable for the following components:    BUN 22 (*)     CREATININE 1.02 (*)     CO2 15 (*)     Anion Gap 21 (*)     GFR Non- 56 (*)     All other components within normal limits       Abhay Digital Screen W Or Wo Cad Bilateral    Result Date: 4/22/2019  EXAMINATION: BILATERAL DIGITAL SCREENING MAMMOGRAM, 4/18/2019 TECHNIQUE: CC and MLO views of the left and right breasts were obtained. Computer aided detection was utilized in the interpretation of this exam. 3D tomosynthesis images were obtained. COMPARISON: Mammogram dated 04/02/2015 HISTORY: Screening.  No family or personal history of breast cancer. FINDINGS: There are scattered areas of fibroglandular density. No suspicious asymmetries, no suspicious calcifications, no architectural distortion. 1.2 cm mass in the retroareolar left breast at 2:00. Right breast: Negative. Left breast: 1.2 cm retroareolar mass. RECOMMENDATION: Additional imaging evaluation of left breast ultrasound. BIRADS:0 BIRADS - CATEGORY 0 Additional imaging is recommended at this time. OVERALL ASSESSMENT - INCOMPLETE:NEED ADDITIONAL IMAGING EVALUATION. Us Breast Limited Left    Result Date: 4/26/2019  EXAMINATION: TARGETED ULTRASOUND OF THE LEFT BREAST 4/26/2019 COMPARISON: Mammogram dated 04/18/2019, 04/02/2015. Sonidoia [facility]by HISTORY: ORDERING SYSTEM PROVIDED HISTORY: Abnormal mammogram.  Mass in the retroareolar left breast on mammogram. FINDINGS: Mass in the retroareolar left breast seen on mammogram likely represents an [place] of dense fibroglandular tissue with no correlation on ultrasound. No solid or cystic masses in the retroareolar left breast on ultrasound. Negative. No ultrasound evidence for malignancy in the left breast. RECOMMENDATION: Annual screening mammogram. BIRADS: BIRADS CATEGORY 1 Negative, no evidence of malignancy. Normal interval follow-up is recommended in 12 months. OVERALL ASSESSMENT - NEGATIVE A letter of notification will be sent to the patient regarding the results. The [employer] recommends annual mammograms for women 40 years and older. RECENT VITALS:     Temp: 98.2 °F (36.8 °C),  Pulse: 87, Resp: 16, BP: (!) 102/53, SpO2: 92 %    This patient is a 46 y.o. Female with acute alcohol intoxication. She was found intoxicated and vomiting on her neighbor's yard. Patient found to have ethanol level of 240 with sober time of 4am. Plan to discharge if normal reassessment once sober.      OUTSTANDING TASKS / RECOMMENDATIONS:    1. Reassess once sober - patient medically stable for discharge      Alma Owens DO  Attending Emergency Physician  [facility] ED        Alma Owens DO  05/07/19 [id number]

## 2019-07-05 ENCOUNTER — TELEPHONE (OUTPATIENT)
Dept: INTERNAL MEDICINE | Age: 54
End: 2019-07-05

## 2019-07-19 DIAGNOSIS — Z12.11 SCREENING FOR COLON CANCER: ICD-10-CM

## 2019-09-10 DIAGNOSIS — G43.709 CHRONIC MIGRAINE WITHOUT AURA WITHOUT STATUS MIGRAINOSUS, NOT INTRACTABLE: ICD-10-CM

## 2019-09-10 RX ORDER — IBUPROFEN 400 MG/1
600 TABLET ORAL EVERY 8 HOURS PRN
Qty: 20 TABLET | Refills: 0 | Status: SHIPPED | OUTPATIENT
Start: 2019-09-10 | End: 2019-10-01

## 2019-10-01 ENCOUNTER — OFFICE VISIT (OUTPATIENT)
Dept: INTERNAL MEDICINE | Age: 54
End: 2019-10-01
Payer: MEDICAID

## 2019-10-01 VITALS
DIASTOLIC BLOOD PRESSURE: 86 MMHG | WEIGHT: 126 LBS | BODY MASS INDEX: 21.51 KG/M2 | HEIGHT: 64 IN | SYSTOLIC BLOOD PRESSURE: 137 MMHG | HEART RATE: 74 BPM | OXYGEN SATURATION: 98 %

## 2019-10-01 DIAGNOSIS — J44.9 COPD, SEVERE (HCC): ICD-10-CM

## 2019-10-01 DIAGNOSIS — J30.89 SEASONAL ALLERGIC RHINITIS DUE TO OTHER ALLERGIC TRIGGER: ICD-10-CM

## 2019-10-01 PROCEDURE — G8926 SPIRO NO PERF OR DOC: HCPCS | Performed by: STUDENT IN AN ORGANIZED HEALTH CARE EDUCATION/TRAINING PROGRAM

## 2019-10-01 PROCEDURE — 99214 OFFICE O/P EST MOD 30 MIN: CPT | Performed by: STUDENT IN AN ORGANIZED HEALTH CARE EDUCATION/TRAINING PROGRAM

## 2019-10-01 PROCEDURE — G8484 FLU IMMUNIZE NO ADMIN: HCPCS | Performed by: STUDENT IN AN ORGANIZED HEALTH CARE EDUCATION/TRAINING PROGRAM

## 2019-10-01 PROCEDURE — 99211 OFF/OP EST MAY X REQ PHY/QHP: CPT

## 2019-10-01 PROCEDURE — G8420 CALC BMI NORM PARAMETERS: HCPCS | Performed by: STUDENT IN AN ORGANIZED HEALTH CARE EDUCATION/TRAINING PROGRAM

## 2019-10-01 PROCEDURE — 3023F SPIROM DOC REV: CPT | Performed by: STUDENT IN AN ORGANIZED HEALTH CARE EDUCATION/TRAINING PROGRAM

## 2019-10-01 PROCEDURE — 4004F PT TOBACCO SCREEN RCVD TLK: CPT | Performed by: STUDENT IN AN ORGANIZED HEALTH CARE EDUCATION/TRAINING PROGRAM

## 2019-10-01 PROCEDURE — 3017F COLORECTAL CA SCREEN DOC REV: CPT | Performed by: STUDENT IN AN ORGANIZED HEALTH CARE EDUCATION/TRAINING PROGRAM

## 2019-10-01 PROCEDURE — G8427 DOCREV CUR MEDS BY ELIG CLIN: HCPCS | Performed by: STUDENT IN AN ORGANIZED HEALTH CARE EDUCATION/TRAINING PROGRAM

## 2019-10-01 RX ORDER — OXYMETAZOLINE HYDROCHLORIDE 0.05 G/100ML
2 SPRAY NASAL 2 TIMES DAILY
Qty: 1 BOTTLE | Refills: 1 | Status: SHIPPED | OUTPATIENT
Start: 2019-10-01 | End: 2020-09-30

## 2019-10-01 RX ORDER — LORATADINE 10 MG/1
10 TABLET ORAL DAILY
Qty: 30 TABLET | Refills: 2 | Status: SHIPPED | OUTPATIENT
Start: 2019-10-01 | End: 2020-10-06

## 2019-10-01 RX ORDER — FLUTICASONE PROPIONATE 50 MCG
1 SPRAY, SUSPENSION (ML) NASAL DAILY
Qty: 1 BOTTLE | Refills: 0 | Status: SHIPPED | OUTPATIENT
Start: 2019-10-01 | End: 2019-10-01 | Stop reason: CLARIF

## 2019-10-01 RX ORDER — IBUPROFEN 200 MG
200 TABLET ORAL EVERY 6 HOURS PRN
Qty: 120 TABLET | Refills: 3 | Status: SHIPPED | OUTPATIENT
Start: 2019-10-01 | End: 2020-04-03 | Stop reason: SDUPTHER

## 2019-10-01 RX ORDER — FLUTICASONE PROPIONATE 50 MCG
1 SPRAY, SUSPENSION (ML) NASAL DAILY
Qty: 2 BOTTLE | Refills: 1 | Status: SHIPPED | OUTPATIENT
Start: 2019-10-01 | End: 2021-08-31 | Stop reason: SDUPTHER

## 2019-10-01 RX ORDER — LORATADINE 10 MG/1
10 TABLET ORAL DAILY
Qty: 30 TABLET | Refills: 3 | Status: SHIPPED | OUTPATIENT
Start: 2019-10-01 | End: 2019-10-01 | Stop reason: CLARIF

## 2019-10-01 RX ORDER — OMEPRAZOLE 20 MG/1
20 CAPSULE, DELAYED RELEASE ORAL 2 TIMES DAILY PRN
Qty: 180 CAPSULE | Refills: 1 | Status: SHIPPED | OUTPATIENT
Start: 2019-10-01 | End: 2021-01-08 | Stop reason: SDUPTHER

## 2019-10-01 RX ORDER — PANTOPRAZOLE SODIUM 20 MG/1
20 TABLET, DELAYED RELEASE ORAL DAILY
Qty: 90 TABLET | Refills: 1 | Status: SHIPPED | OUTPATIENT
Start: 2019-10-01 | End: 2020-05-28

## 2019-10-14 ENCOUNTER — TELEPHONE (OUTPATIENT)
Dept: INTERNAL MEDICINE | Age: 54
End: 2019-10-14

## 2020-04-03 RX ORDER — IBUPROFEN 200 MG
200 TABLET ORAL EVERY 6 HOURS PRN
Qty: 120 TABLET | Refills: 3 | Status: SHIPPED | OUTPATIENT
Start: 2020-04-03 | End: 2021-01-08 | Stop reason: SDUPTHER

## 2020-04-03 RX ORDER — ALBUTEROL SULFATE 90 UG/1
2 AEROSOL, METERED RESPIRATORY (INHALATION) EVERY 6 HOURS PRN
Qty: 1 INHALER | Refills: 3 | Status: SHIPPED | OUTPATIENT
Start: 2020-04-03 | End: 2020-10-06

## 2020-04-08 ENCOUNTER — TELEPHONE (OUTPATIENT)
Dept: INTERNAL MEDICINE | Age: 55
End: 2020-04-08

## 2020-05-28 ENCOUNTER — VIRTUAL VISIT (OUTPATIENT)
Dept: INTERNAL MEDICINE | Age: 55
End: 2020-05-28
Payer: MEDICAID

## 2020-05-28 ENCOUNTER — TELEPHONE (OUTPATIENT)
Dept: INTERNAL MEDICINE | Age: 55
End: 2020-05-28

## 2020-05-28 VITALS
HEART RATE: 72 BPM | BODY MASS INDEX: 20.83 KG/M2 | SYSTOLIC BLOOD PRESSURE: 155 MMHG | HEIGHT: 64 IN | WEIGHT: 122 LBS | DIASTOLIC BLOOD PRESSURE: 72 MMHG

## 2020-05-28 PROBLEM — F10.920 ACUTE ALCOHOLIC INTOXICATION WITHOUT COMPLICATION (HCC): Status: ACTIVE | Noted: 2020-05-28

## 2020-05-28 PROCEDURE — 99212 OFFICE O/P EST SF 10 MIN: CPT | Performed by: INTERNAL MEDICINE

## 2020-05-28 RX ORDER — ACETAMINOPHEN 500 MG
500 TABLET ORAL EVERY 6 HOURS PRN
Qty: 30 TABLET | Refills: 1 | Status: SHIPPED | OUTPATIENT
Start: 2020-05-28 | End: 2021-01-08 | Stop reason: SDUPTHER

## 2020-05-28 RX ORDER — NITROFURANTOIN 25; 75 MG/1; MG/1
100 CAPSULE ORAL 2 TIMES DAILY
Qty: 20 CAPSULE | Refills: 0 | Status: SHIPPED | OUTPATIENT
Start: 2020-05-28 | End: 2020-06-07

## 2020-05-28 ASSESSMENT — PATIENT HEALTH QUESTIONNAIRE - PHQ9
SUM OF ALL RESPONSES TO PHQ QUESTIONS 1-9: 1
2. FEELING DOWN, DEPRESSED OR HOPELESS: 1
SUM OF ALL RESPONSES TO PHQ9 QUESTIONS 1 & 2: 1
SUM OF ALL RESPONSES TO PHQ QUESTIONS 1-9: 1
1. LITTLE INTEREST OR PLEASURE IN DOING THINGS: 0

## 2020-05-28 NOTE — PROGRESS NOTES
cholesterol level  Lipid Panel       I affirm this is a Patient Initiated Episode with a Patient who has not had a related appointment within my department in the past 7 days or scheduled within the next 24 hours.     Patient identification was verified at the start of the visit: Yes    Total Time: minutes: 5-10 minutes    Note: not billable if this call serves to triage the patient into an appointment for the relevant concern      Zoraida Denise

## 2020-07-29 ENCOUNTER — TELEPHONE (OUTPATIENT)
Dept: ONCOLOGY | Age: 55
End: 2020-07-29

## 2020-07-29 NOTE — LETTER
7/29/2020        Diego Acuña  [address] [facility]    Dear Diego Acuña:    Your healthcare provider has ordered a low dose CT scan of the chest for lung cancer screening. You will find enclosed, information about CT lung screening. Please review the statement of understanding, you will be asked to sign a copy of this at the time of your CT scan    If you have not already been contacted to make the appointment for your scan, please call our scheduling department at [phone]    Keep in mind that CT lung screening does not take the place of smoking cessation. If you are a current smoker, you will find enclosed smoking cessation resources. Please do not hesitate to contact me if you have any questions or concerns.     [address],      [address] [facility] Lung Screening Program  [phone]

## 2020-08-04 ENCOUNTER — HOSPITAL ENCOUNTER (OUTPATIENT)
Dept: CT IMAGING | Age: 55
Discharge: HOME | End: 2020-08-06
Payer: MEDICAID

## 2020-08-04 PROCEDURE — G0297 LDCT FOR LUNG CA SCREEN: HCPCS

## 2020-08-17 ENCOUNTER — TELEPHONE (OUTPATIENT)
Dept: INTERNAL MEDICINE | Age: 55
End: 2020-08-17

## 2020-09-02 ENCOUNTER — HOSPITAL ENCOUNTER (OUTPATIENT)
Age: 55
Setting detail: SPECIMEN
Discharge: HOME | End: 2020-09-02
Payer: MEDICAID

## 2020-09-02 LAB
ALBUMIN SERPL-MCNC: 4.5 G/DL (ref 3.5–5.2)
ALBUMIN/GLOBULIN RATIO: 1.3 (ref 1–2.5)
ALP BLD-CCNC: 78 U/L (ref 35–104)
ALT SERPL-CCNC: 22 U/L (ref 5–33)
ANION GAP SERPL CALCULATED.3IONS-SCNC: 15 MMOL/L (ref 9–17)
AST SERPL-CCNC: 47 U/L
BILIRUB SERPL-MCNC: 0.26 MG/DL (ref 0.3–1.2)
BUN BLDV-MCNC: 13 MG/DL (ref 6–20)
BUN/CREAT BLD: ABNORMAL (ref 9–20)
CALCIUM SERPL-MCNC: 10 MG/DL (ref 8.6–10.4)
CHLORIDE BLD-SCNC: 104 MMOL/L (ref 98–107)
CHOLESTEROL/HDL RATIO: 2
CHOLESTEROL: 212 MG/DL
CO2: 21 MMOL/L (ref 20–31)
CREAT SERPL-MCNC: 0.83 MG/DL (ref 0.5–0.9)
GFR AFRICAN AMERICAN: >60 ML/MIN
GFR NON-AFRICAN AMERICAN: >60 ML/MIN
GFR SERPL CREATININE-BSD FRML MDRD: ABNORMAL ML/MIN/{1.73_M2}
GFR SERPL CREATININE-BSD FRML MDRD: ABNORMAL ML/MIN/{1.73_M2}
GLUCOSE BLD-MCNC: 79 MG/DL (ref 70–99)
HDLC SERPL-MCNC: 104 MG/DL
HEPATITIS C ANTIBODY: NONREACTIVE
HIV AG/AB: NONREACTIVE
LDL CHOLESTEROL: 82 MG/DL (ref 0–130)
POTASSIUM SERPL-SCNC: 4.5 MMOL/L (ref 3.7–5.3)
SODIUM BLD-SCNC: 140 MMOL/L (ref 135–144)
TOTAL PROTEIN: 7.9 G/DL (ref 6.4–8.3)
TRIGL SERPL-MCNC: 129 MG/DL
VLDLC SERPL CALC-MCNC: ABNORMAL MG/DL (ref 1–30)

## 2020-10-06 RX ORDER — ALCOHOL 62 ML/100ML
LIQUID TOPICAL
Qty: 30 TABLET | Refills: 2 | Status: SHIPPED | OUTPATIENT
Start: 2020-10-06 | End: 2022-08-23 | Stop reason: SDUPTHER

## 2020-10-06 RX ORDER — ALBUTEROL SULFATE 90 UG/1
AEROSOL, METERED RESPIRATORY (INHALATION)
Qty: 8.5 G | Refills: 5 | Status: SHIPPED | OUTPATIENT
Start: 2020-10-06 | End: 2021-02-05 | Stop reason: SDUPTHER

## 2020-10-06 NOTE — TELEPHONE ENCOUNTER
Request for albuterol inhaler - med pended. Please fill if appropriate. Next Visit Date:  No future appointments.     Health Maintenance   Topic Date Due    Cervical cancer screen  09/24/2017    Shingles Vaccine (2 of 2) 05/21/2019    Flu vaccine (1) 09/01/2020    Breast cancer screen  04/18/2021    Low dose CT lung screening  08/04/2021    Colon cancer screen fecal DNA test (Cologuard)  05/11/2022    DTaP/Tdap/Td vaccine (2 - Td) 11/16/2024    Lipid screen  09/02/2025    Pneumococcal 0-64 years Vaccine  Completed    Hepatitis C screen  Completed    HIV screen  Completed    Hepatitis A vaccine  Aged Out    Hepatitis B vaccine  Aged Out    Hib vaccine  Aged Out    Meningococcal (ACWY) vaccine  Aged Out       No results found for: LABA1C          ( goal A1C is < 7)   No results found for: LABMICR  LDL Cholesterol (mg/dL)   Date Value   09/02/2020 82       (goal LDL is <100)   AST (U/L)   Date Value   09/02/2020 47 (H)     ALT (U/L)   Date Value   09/02/2020 22     BUN (mg/dL)   Date Value   09/02/2020 13     BP Readings from Last 3 Encounters:   05/28/20 (!) 155/72   10/01/19 137/86   05/06/19 (!) 102/53          (goal 120/80)    All Future Testing planned in [facility]  Lab Frequency Next Occurrence         Patient Active Problem List:     Tobacco abuse     Allergic rhinitis     Centrilobular emphysema (HCC)     Acute alcoholic intoxication without complication ([address]

## 2020-12-15 ENCOUNTER — TELEPHONE (OUTPATIENT)
Dept: INTERNAL MEDICINE | Age: 55
End: 2020-12-15

## 2021-01-05 ENCOUNTER — TELEPHONE (OUTPATIENT)
Dept: INTERNAL MEDICINE | Age: 56
End: 2021-01-05

## 2021-01-05 ENCOUNTER — NURSE TRIAGE (OUTPATIENT)
Dept: OTHER | Facility: CLINIC | Age: 56
End: 2021-01-05

## 2021-01-05 DIAGNOSIS — J44.9 COPD, SEVERE (HCC): ICD-10-CM

## 2021-01-05 RX ORDER — ALBUTEROL SULFATE 90 UG/1
AEROSOL, METERED RESPIRATORY (INHALATION)
Qty: 8.5 G | Refills: 5 | Status: CANCELLED | OUTPATIENT
Start: 2021-01-05

## 2021-01-05 NOTE — TELEPHONE ENCOUNTER
Has appointment on Friday. Uses albuterol and needs refill. Reason for Disposition   MODERATE longstanding difficulty breathing (e.g., speaks in phrases, SOB even at rest, pulse 100-120) and SAME as normal    Answer Assessment - Initial Assessment Questions  1. RESPIRATORY STATUS: \"Describe your breathing? \" (e.g., wheezing, shortness of breath, unable to speak, severe coughing)         Wheezing SOB    2. ONSET: \"When did this breathing problem begin? \"         A couple weeks. 3. PATTERN \"Does the difficult breathing come and go, or has it been constant since it started? \"         Comes and goes    4. SEVERITY: \"How bad is your breathing? \" (e.g., mild, moderate, severe)     - MILD: No SOB at rest, mild SOB with walking, speaks normally in sentences, can lay down, no retractions, pulse < 100.     - MODERATE: SOB at rest, SOB with minimal exertion and prefers to sit, cannot lie down flat, speaks in phrases, mild retractions, audible wheezing, pulse 100-120.     - SEVERE: Very SOB at rest, speaks in single words, struggling to breathe, sitting hunched forward, retractions, pulse > 120         Mild    5. RECURRENT SYMPTOM: \"Have you had difficulty breathing before? \" If so, ask: \"When was the last time? \" and \"What happened that time? \"         Yes    6. CARDIAC HISTORY: \"Do you have any history of heart disease? \" (e.g., heart attack, angina, bypass surgery, angioplasty)         No    7. LUNG HISTORY: \"Do you have any history of lung disease? \"  (e.g., pulmonary embolus, asthma, emphysema)        COPD    8. CAUSE: \"What do you think is causing the breathing problem? \"         Unsure    9. OTHER SYMPTOMS: \"Do you have any other symptoms? (e.g., dizziness, runny nose, cough, chest pain, fever)        See above    10. PREGNANCY: \"Is there any chance you are pregnant? \" \"When was your last menstrual period? \"          No    11. TRAVEL: \"Have you traveled out of the country in the last month? \" (e.g., travel history, exposures)          No    Protocols used: BREATHING DIFFICULTY-ADULT-[place]    Caller provided care advice and instructed to call back with worsening symptoms. Attention Provider: Thank you for allowing me to participate in the care of your patient. The patient was connected to triage in response to information provided to the ECC. Please do not respond through this encounter as the response is not directed to a shared pool. Message sent for medication refill.

## 2021-01-08 ENCOUNTER — VIRTUAL VISIT (OUTPATIENT)
Dept: INTERNAL MEDICINE | Age: 56
End: 2021-01-08
Payer: MEDICAID

## 2021-01-08 DIAGNOSIS — K21.9 GASTROESOPHAGEAL REFLUX DISEASE WITHOUT ESOPHAGITIS: ICD-10-CM

## 2021-01-08 DIAGNOSIS — G44.89 OTHER HEADACHE SYNDROME: Primary | ICD-10-CM

## 2021-01-08 DIAGNOSIS — J43.2 CENTRILOBULAR EMPHYSEMA (HCC): ICD-10-CM

## 2021-01-08 PROBLEM — F10.920 ACUTE ALCOHOLIC INTOXICATION WITHOUT COMPLICATION (HCC): Status: RESOLVED | Noted: 2020-05-28 | Resolved: 2021-01-08

## 2021-01-08 PROCEDURE — 99442 PR PHYS/QHP TELEPHONE EVALUATION 11-20 MIN: CPT | Performed by: INTERNAL MEDICINE

## 2021-01-08 RX ORDER — IBUPROFEN 200 MG
200 TABLET ORAL EVERY 6 HOURS PRN
Qty: 120 TABLET | Refills: 3 | Status: SHIPPED | OUTPATIENT
Start: 2021-01-08 | End: 2021-07-27 | Stop reason: SDUPTHER

## 2021-01-08 RX ORDER — ACETAMINOPHEN 500 MG
500 TABLET ORAL EVERY 6 HOURS PRN
Qty: 30 TABLET | Refills: 1 | Status: SHIPPED | OUTPATIENT
Start: 2021-01-08 | End: 2021-02-05 | Stop reason: SDUPTHER

## 2021-01-08 RX ORDER — OMEPRAZOLE 20 MG/1
20 CAPSULE, DELAYED RELEASE ORAL 2 TIMES DAILY PRN
Qty: 180 CAPSULE | Refills: 1 | Status: SHIPPED | OUTPATIENT
Start: 2021-01-08 | End: 2021-08-31 | Stop reason: SDUPTHER

## 2021-01-08 RX ORDER — TIOTROPIUM BROMIDE 18 UG/1
18 CAPSULE ORAL; RESPIRATORY (INHALATION) DAILY
Qty: 90 CAPSULE | Refills: 1 | Status: SHIPPED | OUTPATIENT
Start: 2021-01-08 | End: 2021-07-27 | Stop reason: SDUPTHER

## 2021-01-08 ASSESSMENT — PATIENT HEALTH QUESTIONNAIRE - PHQ9
SUM OF ALL RESPONSES TO PHQ QUESTIONS 1-9: 2
2. FEELING DOWN, DEPRESSED OR HOPELESS: 1

## 2021-01-08 NOTE — PROGRESS NOTES
Patient identification was verified at the start of the visit: Yes    Total Time: minutes: 11-20 minutes    Note: not billable if this call serves to triage the patient into an appointment for the relevant concern      Daron Garcia

## 2021-02-05 ENCOUNTER — VIRTUAL VISIT (OUTPATIENT)
Dept: INTERNAL MEDICINE | Age: 56
End: 2021-02-05
Payer: MEDICAID

## 2021-02-05 DIAGNOSIS — J44.9 COPD, SEVERE (HCC): ICD-10-CM

## 2021-02-05 DIAGNOSIS — G44.89 OTHER HEADACHE SYNDROME: ICD-10-CM

## 2021-02-05 PROCEDURE — 99442 PR PHYS/QHP TELEPHONE EVALUATION 11-20 MIN: CPT | Performed by: INTERNAL MEDICINE

## 2021-02-05 RX ORDER — ALBUTEROL SULFATE 90 UG/1
AEROSOL, METERED RESPIRATORY (INHALATION)
Qty: 8.5 G | Refills: 5 | Status: SHIPPED | OUTPATIENT
Start: 2021-02-05 | End: 2021-07-27 | Stop reason: SDUPTHER

## 2021-02-05 RX ORDER — ACETAMINOPHEN 500 MG
500 TABLET ORAL EVERY 6 HOURS PRN
Qty: 30 TABLET | Refills: 1 | Status: SHIPPED | OUTPATIENT
Start: 2021-02-05 | End: 2021-08-31 | Stop reason: SDUPTHER

## 2021-02-05 NOTE — PATIENT INSTRUCTIONS
Medications e-scribe to pharmacy of pt's choice. Patient to return to clinic 6 months (lea will call and schedule appt). AVS reviewed and mailed to pt. It is very important for your care that you keep your appointment. If for some reason you are unable to keep your appointment it is equally important that you call our office at [phone] to cancel your appointment and reschedule. Failure to do so may result in your termination from our practice.   MB

## 2021-07-26 DIAGNOSIS — G44.89 OTHER HEADACHE SYNDROME: ICD-10-CM

## 2021-07-26 DIAGNOSIS — J43.2 CENTRILOBULAR EMPHYSEMA (HCC): ICD-10-CM

## 2021-07-26 DIAGNOSIS — J44.9 COPD, SEVERE (HCC): ICD-10-CM

## 2021-07-27 RX ORDER — TIOTROPIUM BROMIDE 18 UG/1
18 CAPSULE ORAL; RESPIRATORY (INHALATION) DAILY
Qty: 90 CAPSULE | Refills: 1 | Status: SHIPPED | OUTPATIENT
Start: 2021-07-27 | End: 2021-08-31 | Stop reason: SDUPTHER

## 2021-07-27 RX ORDER — ALBUTEROL SULFATE 90 UG/1
AEROSOL, METERED RESPIRATORY (INHALATION)
Qty: 8.5 G | Refills: 5 | Status: SHIPPED | OUTPATIENT
Start: 2021-07-27 | End: 2021-08-31 | Stop reason: SDUPTHER

## 2021-07-27 RX ORDER — IBUPROFEN 200 MG
200 TABLET ORAL EVERY 6 HOURS PRN
Qty: 120 TABLET | Refills: 3 | Status: SHIPPED | OUTPATIENT
Start: 2021-07-27 | End: 2021-08-31 | Stop reason: SDUPTHER

## 2021-08-05 ENCOUNTER — TELEPHONE (OUTPATIENT)
Dept: ONCOLOGY | Age: 56
End: 2021-08-05

## 2021-08-05 DIAGNOSIS — Z87.891 PERSONAL HISTORY OF NICOTINE DEPENDENCE: Primary | ICD-10-CM

## 2021-08-05 NOTE — TELEPHONE ENCOUNTER
Our records indicate that your patient is due for their annual lung cancer screening follow up testing. For your convenience, we have pended the order for the scan for you. If you do not agree with the need for the test, please cancel the order and let us know. Sincerely,    [address] [place] [address] Screening Program    Auto printed reminder letter sent to patient.

## 2021-08-19 ENCOUNTER — HOSPITAL ENCOUNTER (OUTPATIENT)
Dept: CT IMAGING | Age: 56
Discharge: HOME | End: 2021-08-21
Payer: MEDICAID

## 2021-08-19 DIAGNOSIS — Z87.891 PERSONAL HISTORY OF NICOTINE DEPENDENCE: ICD-10-CM

## 2021-08-19 PROCEDURE — 71271 CT THORAX LUNG CANCER SCR C-: CPT

## 2021-08-27 DIAGNOSIS — J44.9 COPD, SEVERE (HCC): ICD-10-CM

## 2021-08-27 NOTE — TELEPHONE ENCOUNTER
Albuterol inhaler      Pt has refill request    Next Visit Date:  Future Appointments   Date Time Provider Horacio Salinas   8/31/2021  2:00 PM Tereza Smith MD [facility] IM [address] Maintenance   Topic Date Due    Cervical cancer screen  09/24/2017    Breast cancer screen  04/18/2021    Flu vaccine (1) 09/01/2021    Colon cancer screen fecal DNA test (Cologuard)  05/11/2022    DTaP/Tdap/Td vaccine (2 - Td or Tdap) 11/16/2024    Lipid screen  09/02/2025    Pneumococcal 0-64 years Vaccine (2 of 2 - PPSV23) 08/08/2029    Shingles Vaccine  Completed    COVID-19 Vaccine  Completed    Hepatitis C screen  Completed    HIV screen  Completed    Hepatitis A vaccine  Aged Out    Hepatitis B vaccine  Aged Out    Hib vaccine  Aged Out    Meningococcal (ACWY) vaccine  Aged Out       No results found for: LABA1C          ( goal A1C is < 7)   No results found for: LABMICR  LDL Cholesterol (mg/dL)   Date Value   09/02/2020 82       (goal LDL is <100)   AST (U/L)   Date Value   09/02/2020 47 (H)     ALT (U/L)   Date Value   09/02/2020 22     BUN (mg/dL)   Date Value   09/02/2020 13     BP Readings from Last 3 Encounters:   05/28/20 (!) 155/72   10/01/19 137/86   05/06/19 (!) 102/53          (goal 120/80)    All Future Testing planned in [facility]  Lab Frequency Next Occurrence               Patient Active Problem List:     Tobacco abuse     Allergic rhinitis     Centrilobular emphysema ([address]

## 2021-08-31 ENCOUNTER — OFFICE VISIT (OUTPATIENT)
Dept: INTERNAL MEDICINE | Age: 56
End: 2021-08-31
Payer: MEDICAID

## 2021-08-31 VITALS
WEIGHT: 117.4 LBS | BODY MASS INDEX: 20.04 KG/M2 | HEART RATE: 105 BPM | HEIGHT: 64 IN | SYSTOLIC BLOOD PRESSURE: 126 MMHG | DIASTOLIC BLOOD PRESSURE: 83 MMHG

## 2021-08-31 DIAGNOSIS — Z12.31 SCREENING MAMMOGRAM FOR HIGH-RISK PATIENT: ICD-10-CM

## 2021-08-31 DIAGNOSIS — K21.9 GASTROESOPHAGEAL REFLUX DISEASE WITHOUT ESOPHAGITIS: ICD-10-CM

## 2021-08-31 DIAGNOSIS — G44.89 OTHER HEADACHE SYNDROME: ICD-10-CM

## 2021-08-31 DIAGNOSIS — J43.2 CENTRILOBULAR EMPHYSEMA (HCC): ICD-10-CM

## 2021-08-31 DIAGNOSIS — G56.02 CARPAL TUNNEL SYNDROME OF LEFT WRIST: Primary | ICD-10-CM

## 2021-08-31 DIAGNOSIS — J30.89 SEASONAL ALLERGIC RHINITIS DUE TO OTHER ALLERGIC TRIGGER: ICD-10-CM

## 2021-08-31 PROCEDURE — G8420 CALC BMI NORM PARAMETERS: HCPCS | Performed by: INTERNAL MEDICINE

## 2021-08-31 PROCEDURE — 3023F SPIROM DOC REV: CPT | Performed by: INTERNAL MEDICINE

## 2021-08-31 PROCEDURE — 4004F PT TOBACCO SCREEN RCVD TLK: CPT | Performed by: INTERNAL MEDICINE

## 2021-08-31 PROCEDURE — 3017F COLORECTAL CA SCREEN DOC REV: CPT | Performed by: INTERNAL MEDICINE

## 2021-08-31 PROCEDURE — 99214 OFFICE O/P EST MOD 30 MIN: CPT | Performed by: INTERNAL MEDICINE

## 2021-08-31 PROCEDURE — G8427 DOCREV CUR MEDS BY ELIG CLIN: HCPCS | Performed by: INTERNAL MEDICINE

## 2021-08-31 PROCEDURE — G8926 SPIRO NO PERF OR DOC: HCPCS | Performed by: INTERNAL MEDICINE

## 2021-08-31 RX ORDER — OMEPRAZOLE 20 MG/1
20 CAPSULE, DELAYED RELEASE ORAL 2 TIMES DAILY PRN
Qty: 180 CAPSULE | Refills: 1 | Status: SHIPPED | OUTPATIENT
Start: 2021-08-31 | End: 2022-03-17

## 2021-08-31 RX ORDER — IBUPROFEN 200 MG
200 TABLET ORAL EVERY 6 HOURS PRN
Qty: 120 TABLET | Refills: 3 | Status: SHIPPED | OUTPATIENT
Start: 2021-08-31 | End: 2021-10-19

## 2021-08-31 RX ORDER — ACETAMINOPHEN 500 MG
500 TABLET ORAL EVERY 6 HOURS PRN
Qty: 30 TABLET | Refills: 1 | Status: SHIPPED | OUTPATIENT
Start: 2021-08-31 | End: 2022-03-17

## 2021-08-31 RX ORDER — FLUTICASONE PROPIONATE 50 MCG
1 SPRAY, SUSPENSION (ML) NASAL DAILY
Qty: 2 BOTTLE | Refills: 1 | Status: SHIPPED | OUTPATIENT
Start: 2021-08-31 | End: 2022-08-23 | Stop reason: SDUPTHER

## 2021-08-31 RX ORDER — BUDESONIDE AND FORMOTEROL FUMARATE DIHYDRATE 160; 4.5 UG/1; UG/1
2 AEROSOL RESPIRATORY (INHALATION) 2 TIMES DAILY
Qty: 3 INHALER | Refills: 1 | Status: SHIPPED | OUTPATIENT
Start: 2021-08-31 | End: 2022-03-17

## 2021-08-31 RX ORDER — ALBUTEROL SULFATE 90 UG/1
AEROSOL, METERED RESPIRATORY (INHALATION)
Qty: 8.5 G | Refills: 5 | Status: SHIPPED | OUTPATIENT
Start: 2021-08-31 | End: 2022-08-23

## 2021-08-31 RX ORDER — TIOTROPIUM BROMIDE 18 UG/1
18 CAPSULE ORAL; RESPIRATORY (INHALATION) DAILY
Qty: 90 CAPSULE | Refills: 1 | Status: SHIPPED | OUTPATIENT
Start: 2021-08-31 | End: 2022-03-17 | Stop reason: SDUPTHER

## 2021-08-31 SDOH — ECONOMIC STABILITY: TRANSPORTATION INSECURITY
IN THE PAST 12 MONTHS, HAS THE LACK OF TRANSPORTATION KEPT YOU FROM MEDICAL APPOINTMENTS OR FROM GETTING MEDICATIONS?: NO

## 2021-08-31 SDOH — ECONOMIC STABILITY: FOOD INSECURITY: WITHIN THE PAST 12 MONTHS, THE FOOD YOU BOUGHT JUST DIDN'T LAST AND YOU DIDN'T HAVE MONEY TO GET MORE.: OFTEN TRUE

## 2021-08-31 SDOH — ECONOMIC STABILITY: FOOD INSECURITY: WITHIN THE PAST 12 MONTHS, YOU WORRIED THAT YOUR FOOD WOULD RUN OUT BEFORE YOU GOT MONEY TO BUY MORE.: OFTEN TRUE

## 2021-08-31 SDOH — ECONOMIC STABILITY: TRANSPORTATION INSECURITY
IN THE PAST 12 MONTHS, HAS LACK OF TRANSPORTATION KEPT YOU FROM MEETINGS, WORK, OR FROM GETTING THINGS NEEDED FOR DAILY LIVING?: NO

## 2021-08-31 ASSESSMENT — ENCOUNTER SYMPTOMS
CONSTIPATION: 1
EYES NEGATIVE: 1
WHEEZING: 1

## 2021-08-31 ASSESSMENT — SOCIAL DETERMINANTS OF HEALTH (SDOH): HOW HARD IS IT FOR YOU TO PAY FOR THE VERY BASICS LIKE FOOD, HOUSING, MEDICAL CARE, AND HEATING?: NOT HARD AT ALL

## 2021-08-31 NOTE — PROGRESS NOTES
[facility]   Progress Note        Date of patient's visit: 8/31/2021  Patient's Name:  Jan Grewal                   YOB: 1965        PCP:  Jacqueline Puga MD    Jan Grewal is a 61 y.o. female who presents for   Chief Complaint   Patient presents with    COPD    and follow up of chronic medical problems. Patient Active Problem List   Diagnosis    Tobacco abuse    Allergic rhinitis    Centrilobular emphysema ([facility] [address]       HISTORY OF PRESENT ILLNESS:    History was obtained from the patient. COPD:  Current treatment includes short-acting beta agonist inhaler, anticholinergic inhaler. Residual symptoms: none. She denies any other symptoms. She requires her rescue inhaler 1 time(s) per day due to heat outside. Still smoking. Has allergic rhinitis which is well controlled. Carpal Tunnel Syndrome  Patient presents for evaluation of possible carpal tunnel syndrome. Onset of the symptoms was 2 weeks ago. Current symptoms include: tingling/numbness involving the left hand mostly. Aggravating factors: worse at night or first thing in the morning. Symptoms have been intermittent. Evaluation to date: none. Treatment to date: none. Patient's allergies, medications, past medical, surgical, social and family histories were reviewed and updated as appropriate.     ALLERGIES    No Known Allergies      MEDICATIONS:      Current Outpatient Medications   Medication Sig Dispense Refill    albuterol sulfate  (90 Base) MCG/ACT inhaler INHALE 2 PUFFS BY MOUTH EVERY 6 HOURS IF NEEDED FOR WHEEZING 8.5 g 5    tiotropium (SPIRIVA HANDIHALER) 18 MCG inhalation capsule Inhale 1 capsule into the lungs daily 90 capsule 1    ibuprofen (ADVIL) 200 MG tablet Take 1 tablet by mouth every 6 hours as needed for Pain 120 tablet 3    acetaminophen (APAP EXTRA STRENGTH) 500 MG tablet Take 1 tablet by mouth every 6 hours as needed for Pain 30 tablet 1    omeprazole (PRILOSEC) 20 MG delayed release capsule Take 1 capsule by mouth 2 times daily as needed (FOR gerd) 180 capsule 1    RA LORATADINE 10 MG tablet take 1 tablet by mouth once daily 30 tablet 2    fluticasone (FLONASE) 50 MCG/ACT nasal spray 1 spray by Each Nostril route daily 1 Spray in each nostril 2 Bottle 1    Blood Pressure KIT 1 each by Does not apply route daily 1 kit 0     No current facility-administered medications for this visit. Patient Care Team:  Ernesto Mccloud MD as PCP - General (Internal Medicine)  Ernesto Mccloud MD as PCP - [facility]  Andrew Mccoy RN as Imaging Navigator    PAST MEDICAL AND SURGICAL HISTORY:      Past Medical History:   Diagnosis Date    Allergic rhinitis 7/18/2014    Centrilobular emphysema ([address] 5/2/2019    Tobacco abuse      Past Surgical History:   Procedure Laterality Date    TUBAL LIGATION         SOCIAL HISTORY      Social History     Tobacco Use    Smoking status: Current Every Day Smoker     Packs/day: 1.00     Years: 35.00     Pack years: 35.00     Types: Cigarettes    Smokeless tobacco: Never Used   Substance Use Topics    Alcohol use: Yes     Alcohol/week: 6.0 standard drinks     Types: 6 Cans of beer per week     Cathie Singh  reports that she has been smoking cigarettes. She has a 35.00 pack-year smoking history. She has never used smokeless tobacco.    FAMILY HISTORY:      Family History   Problem Relation Age of Onset    High Blood Pressure Mother     Heart Disease Mother     Coronary Art Dis Mother     Stroke Father        REVIEW OF SYSTEMS:    Review of Systems   Constitutional: Negative. HENT: Positive for congestion. Eyes: Negative. Respiratory: Positive for wheezing. Cardiovascular: Negative. Gastrointestinal: Positive for constipation. Endocrine: Negative. Genitourinary: Negative. Musculoskeletal: Negative. Allergic/Immunologic: Positive for environmental allergies. Neurological: Negative. Hematological: Negative. Psychiatric/Behavioral: Negative. PHYSICAL EXAM:      Vitals:    08/31/21 1418   BP: 126/83   Pulse: 105     BP Readings from Last 3 Encounters:   08/31/21 126/83   05/28/20 (!) 155/72   10/01/19 137/86       Physical Examination: General appearance - alert, well appearing, and in no distress  Mental status - alert, oriented to person, place, and time  Chest - clear to auscultation, no wheezes, rales or rhonchi, symmetric air entry  Heart - normal rate and regular rhythm  Abdomen - bowel sounds normal  Back exam - full range of motion, no tenderness, palpable spasm or pain on motion  Neurological - alert, oriented, normal speech, no focal findings or movement disorder noted  Musculoskeletal - no joint tenderness, deformity or swelling  Extremities - no pedal edema noted    LABORATORY FINDINGS:    CBC:   Lab Results   Component Value Date    WBC 12.1 05/06/2019    HGB 12.7 05/06/2019     05/06/2019     BMP:    Lab Results   Component Value Date     09/02/2020    K 4.5 09/02/2020     09/02/2020    CO2 21 09/02/2020    BUN 13 09/02/2020    CREATININE 0.83 09/02/2020    GLUCOSE 79 09/02/2020     Hemoglobin A1C: No results found for: LABA1C  Microalbumin Urine: No results found for: MICROALBUR  Lipid profile:   Lab Results   Component Value Date    CHOL 212 09/02/2020    TRIG 129 09/02/2020     09/02/2020     Thyroid functions: No results found for: TSH   Hepatic functions:   Lab Results   Component Value Date    ALT 22 09/02/2020    AST 47 09/02/2020    PROT 7.9 09/02/2020    BILITOT 0.26 09/02/2020    BILIDIR <0.08 05/06/2019    LABALBU 4.5 09/02/2020     Urine Analysis: No results found for: [id number] Anthony Ravi:      Health Maintenance Due   Topic Date Due    Cervical cancer screen  09/24/2017    Breast cancer screen  04/18/2021       ASSESSMENT AND PLAN:       Diagnosis Orders   1. Carpal tunnel syndrome of left wrist  Misc. Devices (WRIST BRACE) MISC   2.  Centrilobular emphysema (HCC)  tiotropium (SPIRIVA HANDIHALER) 18 MCG inhalation capsule    budesonide-formoterol (SYMBICORT) 160-4.5 MCG/ACT AERO   3. Seasonal allergic rhinitis due to other allergic trigger  fluticasone (FLONASE) 50 MCG/ACT nasal spray   4. Gastroesophageal reflux disease without esophagitis  omeprazole (PRILOSEC) 20 MG delayed release capsule   5. Other headache syndrome  acetaminophen (APAP EXTRA STRENGTH) 500 MG tablet    ibuprofen (ADVIL) 200 MG tablet   6. Screening mammogram for high-risk patient  EPHRAIM DIGITAL SCREEN W OR WO CAD BILATERAL     Add Symbicort  Call pharmacy to check on Spiriva and why patient is running out before the month end. FOLLOW UP:   1. Bernard Connolly received counseling on the following healthy behaviors: nutrition and exercise    2. Reviewed prior labs and health maintenance. 3.  Discussed use, benefit, and side effects of prescribed medications. Barriers to medication compliance addressed. All patient questions answered. Pt voiced understanding. 4.  Continue current medications, diet and exercise.               Orders Placed This Encounter   Medications    acetaminophen (APAP EXTRA STRENGTH) 500 MG tablet     Sig: Take 1 tablet by mouth every 6 hours as needed for Pain     Dispense:  30 tablet     Refill:  1    fluticasone (FLONASE) 50 MCG/ACT nasal spray     Si spray by Each Nostril route daily 1 Spray in each nostril     Dispense:  2 Bottle     Refill:  1    ibuprofen (ADVIL) 200 MG tablet     Sig: Take 1 tablet by mouth every 6 hours as needed for Pain     Dispense:  120 tablet     Refill:  3    tiotropium (SPIRIVA HANDIHALER) 18 MCG inhalation capsule     Sig: Inhale 1 capsule into the lungs daily     Dispense:  90 capsule     Refill:  1    omeprazole (PRILOSEC) 20 MG delayed release capsule     Sig: Take 1 capsule by mouth 2 times daily as needed (FOR gerd)     Dispense:  180 capsule     Refill:  1          Completed Refills               Requested Prescriptions     Signed Prescriptions Disp Refills    acetaminophen (APAP EXTRA STRENGTH) 500 MG tablet 30 tablet 1     Sig: Take 1 tablet by mouth every 6 hours as needed for Pain    fluticasone (FLONASE) 50 MCG/ACT nasal spray 2 Bottle 1     Si spray by Each Nostril route daily 1 Spray in each nostril    ibuprofen (ADVIL) 200 MG tablet 120 tablet 3     Sig: Take 1 tablet by mouth every 6 hours as needed for Pain    tiotropium (SPIRIVA HANDIHALER) 18 MCG inhalation capsule 90 capsule 1     Sig: Inhale 1 capsule into the lungs daily    omeprazole (PRILOSEC) 20 MG delayed release capsule 180 capsule 1     Sig: Take 1 capsule by mouth 2 times daily as needed (FOR gerd)       5. Patient given educational materials - see patient instructions    6. Was a self-tracking handout given in paper form or via [employer]? Yes  If yes, see orders or list here. Orders Placed This Encounter   Procedures    EPHRAIM DIGITAL SCREEN W OR WO CAD BILATERAL     Standing Status:   Future     Standing Expiration Date:   2022     Scheduling Instructions:      Every year for women age 36 and under 76     Order Specific Question:   Reason for exam:     Answer:   Screening       No follow-ups on file. Patient voiced understanding and agreed to treatment plan. This note is created with the assistance of a speech-recognition program. While intending to generate a document that actually reflects the content of the visit, the document can still have some mistakes which may not have been identified and corrected by editing.       Dr Jacqueline Puga MD, [address]  Associate , Department of Internal Medicine  Resident Ambulatory Site Medical Director  [address] [facility] Internal Medicine  [address] [place] [address],[address]  Internal Medicine Clerkship - Yesica Tinsley                   2021, 2:53 PM

## 2021-08-31 NOTE — PATIENT INSTRUCTIONS
Medications e-scribe to pharmacy of pt's choice. Printed script for Wrist Splints given to pt with signed office notes. Patient to return to clinic 2 weeks (9/16/21)  AVS reviewed and given to pt. It is very important for your care that you keep your appointment. If for some reason you are unable to keep your appointment it is equally important that you call our office at [phone] to cancel your appointment and reschedule. Failure to do so may result in your termination from our practice.   MB

## 2021-09-16 ENCOUNTER — OFFICE VISIT (OUTPATIENT)
Dept: INTERNAL MEDICINE | Age: 56
End: 2021-09-16
Payer: MEDICAID

## 2021-09-16 ENCOUNTER — TELEPHONE (OUTPATIENT)
Dept: INTERNAL MEDICINE | Age: 56
End: 2021-09-16

## 2021-09-16 ENCOUNTER — HOSPITAL ENCOUNTER (OUTPATIENT)
Age: 56
Setting detail: SPECIMEN
Discharge: HOME | End: 2021-09-16
Payer: MEDICAID

## 2021-09-16 VITALS
WEIGHT: 118.8 LBS | DIASTOLIC BLOOD PRESSURE: 86 MMHG | BODY MASS INDEX: 20.28 KG/M2 | HEART RATE: 84 BPM | SYSTOLIC BLOOD PRESSURE: 124 MMHG | HEIGHT: 64 IN

## 2021-09-16 DIAGNOSIS — J43.2 CENTRILOBULAR EMPHYSEMA (HCC): ICD-10-CM

## 2021-09-16 DIAGNOSIS — G56.02 CARPAL TUNNEL SYNDROME OF LEFT WRIST: ICD-10-CM

## 2021-09-16 DIAGNOSIS — Z12.4 CERVICAL CANCER SCREENING: Primary | ICD-10-CM

## 2021-09-16 PROCEDURE — G8427 DOCREV CUR MEDS BY ELIG CLIN: HCPCS | Performed by: INTERNAL MEDICINE

## 2021-09-16 PROCEDURE — 3023F SPIROM DOC REV: CPT | Performed by: INTERNAL MEDICINE

## 2021-09-16 PROCEDURE — 99214 OFFICE O/P EST MOD 30 MIN: CPT | Performed by: INTERNAL MEDICINE

## 2021-09-16 PROCEDURE — 4004F PT TOBACCO SCREEN RCVD TLK: CPT | Performed by: INTERNAL MEDICINE

## 2021-09-16 PROCEDURE — 3017F COLORECTAL CA SCREEN DOC REV: CPT | Performed by: INTERNAL MEDICINE

## 2021-09-16 PROCEDURE — G8420 CALC BMI NORM PARAMETERS: HCPCS | Performed by: INTERNAL MEDICINE

## 2021-09-16 PROCEDURE — G8926 SPIRO NO PERF OR DOC: HCPCS | Performed by: INTERNAL MEDICINE

## 2021-09-16 ASSESSMENT — ENCOUNTER SYMPTOMS
GASTROINTESTINAL NEGATIVE: 1
RESPIRATORY NEGATIVE: 1
EYE DISCHARGE: 1

## 2021-09-16 NOTE — PROGRESS NOTES
[facility]       Date of patient's visit: 9/16/2021  Patient's Name:  Marce Kay                   YOB: 1965        PCP:  Helio Lizarraga MD    Marce Kay is a 61 y.o. female who presents for   Chief Complaint   Patient presents with    2 Week Follow-Up    COPD    Gynecologic Exam    and follow up of chronic medical problems. Patient Active Problem List   Diagnosis    Tobacco abuse    Allergic rhinitis    Centrilobular emphysema ([address]       HISTORY OF PRESENT ILLNESS:    History was obtained from the patient. Routine Gyn Exam  Patient here for routine exam.  Current Complaints: none. Personal Health Questionnaire Reviewed: no     Gynecologic History  Patient's last menstrual period was 04/12/2015 (approximate). Contraception: none  Last Pap: 1 year ago  Results: normal  Last Mammogram: due getting it tomorrow  Results: n/a    COPD:  Current treatment includes combined beta agonist/steroid inhaler, anticholinergic inhaler. Residual symptoms: acute dyspnea. She denies any other symptoms. Not using both inhalers. Patient's allergies, medications, past medical, surgical, social and family histories were reviewed and updated as appropriate.     ALLERGIES    No Known Allergies      MEDICATIONS:      Current Outpatient Medications   Medication Sig Dispense Refill    albuterol sulfate  (90 Base) MCG/ACT inhaler INHALE 2 PUFFS BY MOUTH EVERY 6 HOURS IF NEEDED FOR WHEEZING 8.5 g 5    acetaminophen (APAP EXTRA STRENGTH) 500 MG tablet Take 1 tablet by mouth every 6 hours as needed for Pain 30 tablet 1    fluticasone (FLONASE) 50 MCG/ACT nasal spray 1 spray by Each Nostril route daily 1 Spray in each nostril 2 Bottle 1    ibuprofen (ADVIL) 200 MG tablet Take 1 tablet by mouth every 6 hours as needed for Pain 120 tablet 3    tiotropium (SPIRIVA HANDIHALER) 18 MCG inhalation capsule Inhale 1 capsule into the lungs daily 90 capsule 1    omeprazole (PRILOSEC) Allergic/Immunologic: Positive for environmental allergies. Neurological: Negative. Hematological: Negative. Psychiatric/Behavioral: Negative.           PHYSICAL EXAM:      Vitals:    09/16/21 1406   BP: 124/86   Pulse: 84     BP Readings from Last 3 Encounters:   09/16/21 124/86   08/31/21 126/83   05/28/20 (!) 155/72       Physical Examination: General appearance - alert, well appearing, and in no distress  Mental status - alert, oriented to person, place, and time  Chest - clear to auscultation, no wheezes, rales or rhonchi, symmetric air entry  Heart - normal rate and regular rhythm  Abdomen - soft, nontender, nondistended, no masses or organomegaly  not examined  Pelvic- normal adnexa, vagina and cervix  Back exam - full range of motion, no tenderness, palpable spasm or pain on motion  Neurological - alert, oriented, normal speech, no focal findings or movement disorder noted  Musculoskeletal - no joint tenderness, deformity or swelling  Extremities - no pedal edema noted    LABORATORY FINDINGS:    CBC:   Lab Results   Component Value Date    WBC 12.1 05/06/2019    HGB 12.7 05/06/2019     05/06/2019     BMP:    Lab Results   Component Value Date     09/02/2020    K 4.5 09/02/2020     09/02/2020    CO2 21 09/02/2020    BUN 13 09/02/2020    CREATININE 0.83 09/02/2020    GLUCOSE 79 09/02/2020     Hemoglobin A1C: No results found for: LABA1C  Microalbumin Urine: No results found for: MICROALBUR  Lipid profile:   Lab Results   Component Value Date    CHOL 212 09/02/2020    TRIG 129 09/02/2020     09/02/2020     Thyroid functions: No results found for: TSH   Hepatic functions:   Lab Results   Component Value Date    ALT 22 09/02/2020    AST 47 09/02/2020    PROT 7.9 09/02/2020    BILITOT 0.26 09/02/2020    BILIDIR <0.08 05/06/2019    LABALBU 4.5 09/02/2020     Urine Analysis: No results found for: [id number] Anthony Ravi:      Health Maintenance Due   Topic Date Due    Cervical cancer screen  09/25/2014    Breast cancer screen  04/18/2021       ASSESSMENT AND PLAN:       Diagnosis Orders   1. Cervical cancer screening  [place] OBTAINING SCREEN PAP SMEAR    PAP Smear   2. Carpal tunnel syndrome of left wrist     3. Centrilobular emphysema ([address]     inhaler technique reviewed with pharmacist  Discussed compliance to both inhalers        FOLLOW UP:   1. Mini Romo received counseling on the following healthy behaviors: nutrition and exercise    2. Reviewed prior labs and health maintenance. 3.  Discussed use, benefit, and side effects of prescribed medications. Barriers to medication compliance addressed. All patient questions answered. Pt voiced understanding. 4.  Continue current medications, diet and exercise. No orders of the defined types were placed in this encounter. Completed Refills               Requested Prescriptions      No prescriptions requested or ordered in this encounter       5. Patient given educational materials - see patient instructions    6. Was a self-tracking handout given in paper form or via [employer]? Yes  If yes, see orders or list here. No orders of the defined types were placed in this encounter. No follow-ups on file. Patient voiced understanding and agreed to treatment plan. This note is created with the assistance of a speech-recognition program. While intending to generate a document that actually reflects the content of the visit, the document can still have some mistakes which may not have been identified and corrected by editing.       Dr Joanie Mclaughlin MD, [address]  Associate , Department of Internal Medicine  Resident Ambulatory Site Medical Director  [address] [facility] Internal Medicine  [address] [place] [address],[address]  Internal Medicine Clerkship - Marisol Londono                   9/16/2021, 2:49 PM

## 2021-09-16 NOTE — PATIENT INSTRUCTIONS
Patient to return to clinic 6 months (office will call and schedule appt). AVS reviewed and given to pt. It is very important for your care that you keep your appointment. If for some reason you are unable to keep your appointment it is equally important that you call our office at [phone] to cancel your appointment and reschedule. Failure to do so may result in your termination from our practice.   MB

## 2021-09-16 NOTE — TELEPHONE ENCOUNTER
Patient educated on proper use of Spiriva Handihaler and Symbicort inhaler. Patient able to demonstrate proper inhaler technique for both Symbicort and Spiriva Handihaler. Patient reminded to use both inhalers as the Symbicort inhaler is to not replace the Spiriva inhaler. Patient verbalized understanding.     Alverto Oshea, PharmD, [address]  Ambulatory Clinical Pharmacist   Specialty Medication Service  Phone: [phone]    For Pharmacy [id number] [place] [address] Road in place:  No   Recommendation Provided To: Patient/Caregiver: 1 via In person   Intervention Detail: Device Training    Intervention Accepted By: Patient/Caregiver: 1   Time Spent (min): 15

## 2021-09-24 LAB — CYTOLOGY REPORT: NORMAL

## 2021-09-28 LAB
HPV SAMPLE: NORMAL
HPV, GENOTYPE 16: NOT DETECTED
HPV, GENOTYPE 18: NOT DETECTED
HPV, HIGH RISK OTHER: NOT DETECTED
HPV, INTERPRETATION: NORMAL
SPECIMEN DESCRIPTION: NORMAL

## 2021-10-18 DIAGNOSIS — G44.89 OTHER HEADACHE SYNDROME: ICD-10-CM

## 2021-10-19 RX ORDER — IBUPROFEN 200 MG
TABLET ORAL
Qty: 120 TABLET | Refills: 3 | Status: SHIPPED | OUTPATIENT
Start: 2021-10-19 | End: 2022-03-17

## 2021-10-19 NOTE — TELEPHONE ENCOUNTER
Pt on wait list  advil refill        Next Visit Date:  Future Appointments   Date Time Provider Horacio Salinas   11/2/2021  9:15 AM STA DIAG MAMMO RM 3 STAZ MAMMO STA Radiolog       Health Maintenance   Topic Date Due    Breast cancer screen  04/18/2021    Flu vaccine (1) 09/16/2022 (Originally 9/1/2021)    Colon cancer screen fecal DNA test (Cologuard)  05/11/2022    Low dose CT lung screening  08/19/2022    DTaP/Tdap/Td vaccine (2 - Td or Tdap) 11/16/2024    Lipid screen  09/02/2025    Cervical cancer screen  09/16/2026    Pneumococcal 0-64 years Vaccine (2 of 2 - PPSV23) 08/08/2029    Shingles Vaccine  Completed    COVID-19 Vaccine  Completed    Hepatitis C screen  Completed    HIV screen  Completed    Hepatitis A vaccine  Aged Out    Hepatitis B vaccine  Aged Out    Hib vaccine  Aged Out    Meningococcal (ACWY) vaccine  Aged Out       No results found for: LABA1C          ( goal A1C is < 7)   No results found for: LABMICR  LDL Cholesterol (mg/dL)   Date Value   09/02/2020 82       (goal LDL is <100)   AST (U/L)   Date Value   09/02/2020 47 (H)     ALT (U/L)   Date Value   09/02/2020 22     BUN (mg/dL)   Date Value   09/02/2020 13     BP Readings from Last 3 Encounters:   09/16/21 124/86   08/31/21 126/83   05/28/20 (!) 155/72          (goal 120/80)    All Future Testing planned in [facility]  Lab Frequency Next Occurrence   EPHRAIM DIGITAL SCREEN W OR WO CAD BILATERAL Once 10/16/2021   PAP Smear Once 12/24/2021               Patient Active Problem List:     Tobacco abuse     Allergic rhinitis     Centrilobular emphysema ([address]

## 2021-11-02 ENCOUNTER — HOSPITAL ENCOUNTER (OUTPATIENT)
Dept: MAMMOGRAPHY | Age: 56
Discharge: HOME | End: 2021-11-04
Payer: MEDICAID

## 2021-11-02 DIAGNOSIS — Z12.31 SCREENING MAMMOGRAM FOR HIGH-RISK PATIENT: ICD-10-CM

## 2021-11-02 PROCEDURE — 77067 SCR MAMMO BI INCL CAD: CPT

## 2022-03-17 ENCOUNTER — TELEMEDICINE (OUTPATIENT)
Dept: INTERNAL MEDICINE | Age: 57
End: 2022-03-17
Payer: MEDICAID

## 2022-03-17 DIAGNOSIS — J43.2 CENTRILOBULAR EMPHYSEMA (HCC): ICD-10-CM

## 2022-03-17 DIAGNOSIS — G44.89 OTHER HEADACHE SYNDROME: ICD-10-CM

## 2022-03-17 DIAGNOSIS — K21.9 GASTROESOPHAGEAL REFLUX DISEASE WITHOUT ESOPHAGITIS: ICD-10-CM

## 2022-03-17 PROCEDURE — 99442 PR PHYS/QHP TELEPHONE EVALUATION 11-20 MIN: CPT | Performed by: INTERNAL MEDICINE

## 2022-03-17 RX ORDER — IBUPROFEN 200 MG
TABLET ORAL
Qty: 120 TABLET | Refills: 3 | Status: SHIPPED | OUTPATIENT
Start: 2022-03-17 | End: 2022-08-23 | Stop reason: SDUPTHER

## 2022-03-17 RX ORDER — OMEPRAZOLE 20 MG/1
20 CAPSULE, DELAYED RELEASE ORAL 2 TIMES DAILY PRN
Qty: 180 CAPSULE | Refills: 1 | Status: SHIPPED | OUTPATIENT
Start: 2022-03-17

## 2022-03-17 RX ORDER — ACETAMINOPHEN 500 MG
500 TABLET ORAL EVERY 6 HOURS PRN
Qty: 30 TABLET | Refills: 1 | Status: SHIPPED | OUTPATIENT
Start: 2022-03-17

## 2022-03-17 RX ORDER — BUDESONIDE AND FORMOTEROL FUMARATE DIHYDRATE 160; 4.5 UG/1; UG/1
2 AEROSOL RESPIRATORY (INHALATION) 2 TIMES DAILY
Qty: 10.2 G | Refills: 5 | Status: SHIPPED | OUTPATIENT
Start: 2022-03-17 | End: 2022-08-23 | Stop reason: SDUPTHER

## 2022-03-17 RX ORDER — TIOTROPIUM BROMIDE 18 UG/1
18 CAPSULE ORAL; RESPIRATORY (INHALATION) DAILY
Qty: 90 CAPSULE | Refills: 1 | Status: SHIPPED | OUTPATIENT
Start: 2022-03-17 | End: 2022-08-23 | Stop reason: SDUPTHER

## 2022-03-17 ASSESSMENT — PATIENT HEALTH QUESTIONNAIRE - PHQ9
SUM OF ALL RESPONSES TO PHQ QUESTIONS 1-9: 0
SUM OF ALL RESPONSES TO PHQ9 QUESTIONS 1 & 2: 0
1. LITTLE INTEREST OR PLEASURE IN DOING THINGS: 0
SUM OF ALL RESPONSES TO PHQ QUESTIONS 1-9: 0
2. FEELING DOWN, DEPRESSED OR HOPELESS: 0

## 2022-03-17 NOTE — PATIENT INSTRUCTIONS
Medications e-scribe to pharmacy of pt's choice. Patient to return to clinic 6 months (office will call and schedule appt). AVS reviewed and mailed to pt. It is very important for your care that you keep your appointment. If for some reason you are unable to keep your appointment it is equally important that you call our office at [phone] to cancel your appointment and reschedule. Failure to do so may result in your termination from our practice.   MB

## 2022-03-17 NOTE — PROGRESS NOTES
Vanessa Orellana is a 61 y.o. female evaluated via telephone on 3/17/2022 for COPD (6 month follow up)  . Documentation:  Allergic Rhinitis  Patient presents for evaluation of allergic symptoms. Symptoms include clear rhinorrhea and are present in a seasonal pattern. Precipitants include pollen. Treatment in the past has included claritin, flonase. Treatment currently includes nasal saline and is effective in the patient's opinion. COPD:  Current treatment includes combined beta agonist/steroid inhaler, anticholinergic inhaler. Residual symptoms: none. She denies any other symptoms. She requires her rescue inhaler rarely time(s) per month. Refills needed   Diagnosis Orders   1. Other headache syndrome  acetaminophen (APAP EXTRA STRENGTH) 500 MG tablet    ibuprofen (ADVIL;MOTRIN) 200 MG tablet   2. Centrilobular emphysema (HCC)  budesonide-formoterol (SYMBICORT) 160-4.5 MCG/ACT AERO    tiotropium (SPIRIVA HANDIHALER) 18 MCG inhalation capsule   3. Gastroesophageal reflux disease without esophagitis  omeprazole (PRILOSEC) 20 MG delayed release capsule         Total Time: minutes: 11-20 minutes    Vanessa Orellana was evaluated through a synchronous (real-time) audio encounter. Patient identification was verified at the start of the visit. She (or guardian if applicable) is aware that this is a billable service, which includes applicable co-pays. This visit was conducted with the patient's (and/or legal guardian's) verbal consent. She has not had a related appointment within my department in the past 7 days or scheduled within the next 24 hours. The patient was located in a state where the provider was licensed to provide care.     Note: not billable if this call serves to triage the patient into an appointment for the relevant concern    Carlos Barnes MD

## 2022-07-21 ENCOUNTER — TELEPHONE (OUTPATIENT)
Dept: ONCOLOGY | Age: 57
End: 2022-07-21

## 2022-07-21 DIAGNOSIS — Z87.891 PERSONAL HISTORY OF NICOTINE DEPENDENCE: Primary | ICD-10-CM

## 2022-08-18 DIAGNOSIS — J44.9 COPD, SEVERE (HCC): ICD-10-CM

## 2022-08-19 NOTE — TELEPHONE ENCOUNTER
E-scribe request from [employer] for Albuterol Inhaler. Patient has an appt on 8/23/22.       Health Maintenance   Topic Date Due    Pneumococcal 0-64 years Vaccine (2 - PCV) 03/26/2020    Colorectal Cancer Screen  05/11/2022    COVID-19 Vaccine (4 - Booster for Moderna series) 06/08/2022    Low dose CT lung screening  08/19/2022    Flu vaccine (1) 09/01/2022    Depression Screen  03/17/2023    Breast cancer screen  11/02/2023    DTaP/Tdap/Td vaccine (2 - Td or Tdap) 11/16/2024    Lipids  09/02/2025    Cervical cancer screen  09/16/2026    Shingles vaccine  Completed    Hepatitis C screen  Completed    HIV screen  Completed    Hepatitis A vaccine  Aged Out    Hepatitis B vaccine  Aged Out    Hib vaccine  Aged Out    Meningococcal (ACWY) vaccine  Aged Out             (applicable per patient's age: Cancer Screenings, Depression Screening, Fall Risk Screening, Immunizations)    LDL Cholesterol (mg/dL)   Date Value   09/02/2020 82     AST (U/L)   Date Value   09/02/2020 47 (H)     ALT (U/L)   Date Value   09/02/2020 22     BUN (mg/dL)   Date Value   09/02/2020 13      (goal A1C is < 7)   (goal LDL is <100) need 30-50% reduction from baseline     BP Readings from Last 3 Encounters:   09/16/21 124/86   08/31/21 126/83   05/28/20 (!) 155/72    (goal /80)      All Future Testing planned in [facility]:  Lab Frequency Next Occurrence   CT lung screen [Initial/Annual] Once 07/21/2022       Next Visit Date:  Future Appointments   Date Time Provider Horacio Salinas   8/23/2022  3:00 PM Tien Lan MD [facility]            Patient Active Problem List:     Tobacco abuse     Allergic rhinitis     Centrilobular emphysema ([facility] [address]

## 2022-08-23 ENCOUNTER — TELEMEDICINE (OUTPATIENT)
Dept: INTERNAL MEDICINE | Age: 57
End: 2022-08-23
Payer: MEDICAID

## 2022-08-23 DIAGNOSIS — J30.89 SEASONAL ALLERGIC RHINITIS DUE TO OTHER ALLERGIC TRIGGER: ICD-10-CM

## 2022-08-23 DIAGNOSIS — J44.9 COPD, SEVERE (HCC): ICD-10-CM

## 2022-08-23 DIAGNOSIS — G44.89 OTHER HEADACHE SYNDROME: ICD-10-CM

## 2022-08-23 DIAGNOSIS — J43.2 CENTRILOBULAR EMPHYSEMA (HCC): ICD-10-CM

## 2022-08-23 PROCEDURE — 99213 OFFICE O/P EST LOW 20 MIN: CPT | Performed by: INTERNAL MEDICINE

## 2022-08-23 RX ORDER — DIAPER,BRIEF,INFANT-TODD,DISP
EACH MISCELLANEOUS
Qty: 30 G | Refills: 1 | Status: SHIPPED | OUTPATIENT
Start: 2022-08-23 | End: 2022-08-30

## 2022-08-23 RX ORDER — IBUPROFEN 200 MG
TABLET ORAL
Qty: 120 TABLET | Refills: 3 | Status: SHIPPED | OUTPATIENT
Start: 2022-08-23

## 2022-08-23 RX ORDER — FLUTICASONE PROPIONATE 50 MCG
1 SPRAY, SUSPENSION (ML) NASAL DAILY
Qty: 2 EACH | Refills: 1 | Status: SHIPPED | OUTPATIENT
Start: 2022-08-23

## 2022-08-23 RX ORDER — BUDESONIDE AND FORMOTEROL FUMARATE DIHYDRATE 160; 4.5 UG/1; UG/1
2 AEROSOL RESPIRATORY (INHALATION) 2 TIMES DAILY
Qty: 10.2 G | Refills: 5 | Status: SHIPPED | OUTPATIENT
Start: 2022-08-23

## 2022-08-23 RX ORDER — LORATADINE 10 MG/1
TABLET ORAL
Qty: 30 TABLET | Refills: 2 | Status: SHIPPED | OUTPATIENT
Start: 2022-08-23

## 2022-08-23 RX ORDER — TIOTROPIUM BROMIDE 18 UG/1
18 CAPSULE ORAL; RESPIRATORY (INHALATION) DAILY
Qty: 90 CAPSULE | Refills: 1 | Status: SHIPPED | OUTPATIENT
Start: 2022-08-23

## 2022-08-23 RX ORDER — ALBUTEROL SULFATE 90 UG/1
AEROSOL, METERED RESPIRATORY (INHALATION)
Qty: 8.5 G | Refills: 5 | Status: SHIPPED | OUTPATIENT
Start: 2022-08-23 | End: 2022-08-23 | Stop reason: SDUPTHER

## 2022-08-23 RX ORDER — ALBUTEROL SULFATE 90 UG/1
AEROSOL, METERED RESPIRATORY (INHALATION)
Qty: 8.5 G | Refills: 5 | Status: SHIPPED | OUTPATIENT
Start: 2022-08-23

## 2022-08-23 NOTE — PROGRESS NOTES
Florina Petty is a 61 y.o. female evaluated via telephone on 8/23/2022 for Annual Exam  .        Documentation:  Hypertension:  Home blood pressure monitoring: Yes - good control. She is adherent to a low sodium diet. Patient denies chest pain, shortness of breath, and lightheadedness. Antihypertensive medication side effects: no medication side effects noted. Use of agents associated with hypertension: none. Sodium (mmol/L)   Date Value   09/02/2020 140    BUN (mg/dL)   Date Value   09/02/2020 13    Glucose (mg/dL)   Date Value   09/02/2020 79      Potassium (mmol/L)   Date Value   09/02/2020 4.5    Creatinine (mg/dL)   Date Value   09/02/2020 0.83         Allergic Rhinitis  Patient presents for evaluation of allergic symptoms. Symptoms include clear rhinorrhea and are present in a seasonal pattern. Precipitants include pollen. Treatment in the past has included claritin, flonase. Treatment currently includes nasal saline and is effective in the patient's opinion. She is using allergy meds from [address] [place] [address] that is working better than the prescription meds. COPD:  Current treatment includes combined beta agonist/steroid inhaler, anticholinergic inhaler. Residual symptoms: none. She denies any other symptoms. She requires her rescue inhaler rarely time(s) per month. She needed her rescue inhaler today, this morning. Has been using it abit more often. Diagnosis Orders   1. Other headache syndrome  ibuprofen (ADVIL;MOTRIN) 200 MG tablet      2. COPD, severe ([address]  albuterol sulfate HFA (PROAIR HFA) 108 (90 Base) MCG/ACT inhaler      3. Centrilobular emphysema (HCC)  budesonide-formoterol (SYMBICORT) 160-4.5 MCG/ACT AERO    tiotropium (SPIRIVA HANDIHALER) 18 MCG inhalation capsule      4.  Seasonal allergic rhinitis due to other allergic trigger  fluticasone (FLONASE) 50 MCG/ACT nasal spray    loratadine (RA LORATADINE) 10 MG tablet          Total Time:

## 2023-01-06 NOTE — TELEPHONE ENCOUNTER
Request for Motrin. Next Visit Date:  No future appointments.     Health Maintenance   Topic Date Due    COVID-19 Vaccine (4 - Booster for Moderna series) 04/05/2022    Colorectal Cancer Screen  05/11/2022    Flu vaccine (1) 08/01/2022    Depression Screen  03/17/2023    Breast cancer screen  11/02/2023    DTaP/Tdap/Td vaccine (2 - Td or Tdap) 11/16/2024    Lipids  09/02/2025    Cervical cancer screen  09/16/2026    Shingles vaccine  Completed    Pneumococcal 0-64 years Vaccine  Completed    Hepatitis C screen  Completed    HIV screen  Completed    Hepatitis A vaccine  Aged Out    Hib vaccine  Aged Out    Meningococcal (ACWY) vaccine  Aged Out       No results found for: LABA1C          ( goal A1C is < 7)   No results found for: LABMICR  LDL Cholesterol (mg/dL)   Date Value   09/02/2020 82       (goal LDL is <100)   AST (U/L)   Date Value   09/02/2020 47 (H)     ALT (U/L)   Date Value   09/02/2020 22     BUN (mg/dL)   Date Value   09/02/2020 13     BP Readings from Last 3 Encounters:   09/16/21 124/86   08/31/21 126/83   05/28/20 (!) 155/72          (goal 120/80)    All Future Testing planned in [facility]  Lab Frequency Next Occurrence   CT lung screen [Initial/Annual] Once 07/21/2022         Patient Active Problem List:     Tobacco abuse     Allergic rhinitis     Centrilobular emphysema (HCC)

## 2023-01-09 RX ORDER — IBUPROFEN 800 MG/1
TABLET ORAL
Qty: 20 TABLET | Refills: 1 | Status: SHIPPED | OUTPATIENT
Start: 2023-01-09

## 2023-01-13 ENCOUNTER — TELEPHONE (OUTPATIENT)
Dept: INTERNAL MEDICINE | Age: 58
End: 2023-01-13

## 2023-01-13 NOTE — TELEPHONE ENCOUNTER
PC back to patient in regards to [facility] (JERZYNET) message and also another message of her requesting an pain pill patient stated her line is secure and I dont work in the office, I confirmed the address and also stated my name and title and she told \" Your fired bye\" also stated if anything is gonna be addressed she needs an call from Dr. Marcos Barajas directly.

## 2023-01-13 NOTE — TELEPHONE ENCOUNTER
----- Message from Miguel Angel Bhardwaj sent at 1/13/2023 10:29 AM EST -----  Subject: Message to Provider    QUESTIONS  Information for Provider? Patient wants the DrJarvis to call her please. About   receiving some letters. She tells me that Dr. Leslie Miller said she can call   her. She said that she is ok. But wants to update her personally. Told her   of her appointment she made the other day for 2/2. Please call to advise. Call Home phone 1st and then call [phone] if you cannot get her.  ---------------------------------------------------------------------------  --------------  [phone]  [phone]; OK to leave message on voicemail  ---------------------------------------------------------------------------  --------------  SCRIPT ANSWERS  Relationship to Patient?  Self

## 2023-01-13 NOTE — TELEPHONE ENCOUNTER
Called patient to see how I could help her and introduced myself as the practice manager. Patient seemed happy that I called her but was also all over the place with her thoughts stating we are all fired and  she does not need anything. Patient states she will only talk to her doctor and when I asked if she needed any medication refills, patient replied \"no I do not\". Writer asked patient if she needed to be seen sooner then her 2/2/23 scheduled appt and patient would not answer the question. Patient did seem very confused stating this is all wrong, not trusted and we are fired several times. Writer is unaware of what the patient's baseline is but patient did seem very off, acting very erratic, possibly under the influence of alcohol.

## 2023-02-09 ENCOUNTER — TELEPHONE (OUTPATIENT)
Dept: INTERNAL MEDICINE | Age: 58
End: 2023-02-09

## 2023-02-09 NOTE — TELEPHONE ENCOUNTER
Received fax communication from pharmacy brand name is preferred over generic for Albuterol HFA.  Please reorder and send

## 2023-03-06 NOTE — TELEPHONE ENCOUNTER
Last visit: 8/23/22  Last Med refill:   Does patient have enough medication for 72 hours: No:     Next Visit Date:  Future Appointments   Date Time Provider Horacio Salinas   3/9/2023 10:20 AM Saroj Soriano MD [address] [facility]   Topic Date Due    COVID-19 Vaccine (4 - Booster for Moderna series) 04/05/2022    Colorectal Cancer Screen  05/11/2022    Flu vaccine (1) 08/01/2022    Depression Screen  03/17/2023    Breast cancer screen  11/02/2023    DTaP/Tdap/Td vaccine (2 - Td or Tdap) 11/16/2024    Lipids  09/02/2025    Cervical cancer screen  09/16/2026    Shingles vaccine  Completed    Pneumococcal 0-64 years Vaccine  Completed    Hepatitis C screen  Completed    HIV screen  Completed    Hepatitis A vaccine  Aged Out    Hib vaccine  Aged Out    Meningococcal (ACWY) vaccine  Aged Out       No results found for: LABA1C          ( goal A1C is < 7)   No results found for: LABMICR  LDL Cholesterol (mg/dL)   Date Value   09/02/2020 82       (goal LDL is <100)   AST (U/L)   Date Value   09/02/2020 47 (H)     ALT (U/L)   Date Value   09/02/2020 22     BUN (mg/dL)   Date Value   09/02/2020 13     BP Readings from Last 3 Encounters:   09/16/21 124/86   08/31/21 126/83   05/28/20 (!) 155/72          (goal 120/80)    All Future Testing planned in [facility]  Lab Frequency Next Occurrence   CT lung screen [Initial/Annual] Once 07/21/2022               Patient Active Problem List:     Tobacco abuse     Allergic rhinitis     Centrilobular emphysema ([address]

## 2023-03-07 RX ORDER — IBUPROFEN 800 MG/1
TABLET ORAL
Qty: 20 TABLET | Refills: 1 | Status: SHIPPED | OUTPATIENT
Start: 2023-03-07 | End: 2023-03-09 | Stop reason: SDUPTHER

## 2023-03-09 ENCOUNTER — OFFICE VISIT (OUTPATIENT)
Dept: INTERNAL MEDICINE | Age: 58
End: 2023-03-09
Payer: MEDICAID

## 2023-03-09 VITALS
DIASTOLIC BLOOD PRESSURE: 86 MMHG | WEIGHT: 116.8 LBS | BODY MASS INDEX: 19.94 KG/M2 | SYSTOLIC BLOOD PRESSURE: 138 MMHG | TEMPERATURE: 97.3 F | HEART RATE: 89 BPM | HEIGHT: 64 IN

## 2023-03-09 DIAGNOSIS — D72.829 LEUKOCYTOSIS, UNSPECIFIED TYPE: ICD-10-CM

## 2023-03-09 DIAGNOSIS — Z72.0 TOBACCO ABUSE: ICD-10-CM

## 2023-03-09 DIAGNOSIS — Z12.11 COLON CANCER SCREENING: ICD-10-CM

## 2023-03-09 DIAGNOSIS — G44.89 OTHER HEADACHE SYNDROME: ICD-10-CM

## 2023-03-09 DIAGNOSIS — J30.89 SEASONAL ALLERGIC RHINITIS DUE TO OTHER ALLERGIC TRIGGER: Primary | ICD-10-CM

## 2023-03-09 PROCEDURE — G8427 DOCREV CUR MEDS BY ELIG CLIN: HCPCS | Performed by: INTERNAL MEDICINE

## 2023-03-09 PROCEDURE — 99213 OFFICE O/P EST LOW 20 MIN: CPT | Performed by: INTERNAL MEDICINE

## 2023-03-09 PROCEDURE — 4004F PT TOBACCO SCREEN RCVD TLK: CPT | Performed by: INTERNAL MEDICINE

## 2023-03-09 PROCEDURE — 99211 OFF/OP EST MAY X REQ PHY/QHP: CPT | Performed by: INTERNAL MEDICINE

## 2023-03-09 PROCEDURE — G8484 FLU IMMUNIZE NO ADMIN: HCPCS | Performed by: INTERNAL MEDICINE

## 2023-03-09 PROCEDURE — G8420 CALC BMI NORM PARAMETERS: HCPCS | Performed by: INTERNAL MEDICINE

## 2023-03-09 PROCEDURE — 3017F COLORECTAL CA SCREEN DOC REV: CPT | Performed by: INTERNAL MEDICINE

## 2023-03-09 RX ORDER — ACETAMINOPHEN 500 MG
500 TABLET ORAL EVERY 6 HOURS PRN
Qty: 30 TABLET | Refills: 1 | Status: SHIPPED | OUTPATIENT
Start: 2023-03-09

## 2023-03-09 RX ORDER — IBUPROFEN 800 MG/1
TABLET ORAL
Qty: 20 TABLET | Refills: 0 | Status: SHIPPED | OUTPATIENT
Start: 2023-03-09

## 2023-03-09 RX ORDER — LORATADINE 10 MG/1
TABLET ORAL
Qty: 30 TABLET | Refills: 2 | Status: SHIPPED | OUTPATIENT
Start: 2023-03-09

## 2023-03-09 SDOH — ECONOMIC STABILITY: HOUSING INSECURITY
IN THE LAST 12 MONTHS, WAS THERE A TIME WHEN YOU DID NOT HAVE A STEADY PLACE TO SLEEP OR SLEPT IN A SHELTER (INCLUDING NOW)?: NO

## 2023-03-09 SDOH — ECONOMIC STABILITY: INCOME INSECURITY: HOW HARD IS IT FOR YOU TO PAY FOR THE VERY BASICS LIKE FOOD, HOUSING, MEDICAL CARE, AND HEATING?: NOT HARD AT ALL

## 2023-03-09 SDOH — ECONOMIC STABILITY: FOOD INSECURITY: WITHIN THE PAST 12 MONTHS, YOU WORRIED THAT YOUR FOOD WOULD RUN OUT BEFORE YOU GOT MONEY TO BUY MORE.: NEVER TRUE

## 2023-03-09 SDOH — ECONOMIC STABILITY: FOOD INSECURITY: WITHIN THE PAST 12 MONTHS, THE FOOD YOU BOUGHT JUST DIDN'T LAST AND YOU DIDN'T HAVE MONEY TO GET MORE.: NEVER TRUE

## 2023-03-09 ASSESSMENT — ENCOUNTER SYMPTOMS
NAUSEA: 1
RHINORRHEA: 1
EYE DISCHARGE: 1
COUGH: 1

## 2023-03-09 ASSESSMENT — PATIENT HEALTH QUESTIONNAIRE - PHQ9
1. LITTLE INTEREST OR PLEASURE IN DOING THINGS: 0
2. FEELING DOWN, DEPRESSED OR HOPELESS: 0
SUM OF ALL RESPONSES TO PHQ QUESTIONS 1-9: 0
SUM OF ALL RESPONSES TO PHQ9 QUESTIONS 1 & 2: 0
SUM OF ALL RESPONSES TO PHQ QUESTIONS 1-9: 0

## 2023-03-09 NOTE — PROGRESS NOTES
[facility]   Progress Note        Date of patient's visit: 3/9/2023  Patient's Name:  Gracie Taylor                   YOB: 1965        PCP:  Fly Sanders MD    Gracie Taylor is a 61 y.o. female who presents for   Chief Complaint   Patient presents with    COPD    and follow up of chronic medical problems. Patient Active Problem List   Diagnosis    Tobacco abuse    Allergic rhinitis    Centrilobular emphysema ([facility]       HISTORY OF PRESENT ILLNESS:    History was obtained from the patient. 61YEAR OLD female here for a routine follow up. She has no acute issues today. She is continue to smoke cigarettes and has a diagnosis of emphysema. She denies any worsening shortness of breath and has been using her inhalers as prescribed with no additional need for rescue inhaler use. Patient reports her GERD is well controlled with Prilosec daily  Patient had a recent tooth extraction for which she has been using Motrin regularly. Patient's allergies, medications, past medical, surgical, social and family histories were reviewed and updated as appropriate.     ALLERGIES    No Known Allergies      MEDICATIONS:      Current Outpatient Medications   Medication Sig Dispense Refill    ibuprofen (ADVIL;MOTRIN) 800 MG tablet take 1 tablet by mouth every 6 to 8 hours if needed for pain 20 tablet 1    PROAIR  (90 Base) MCG/ACT inhaler Inhale 2 puffs into the lungs every 6 hours as needed for Wheezing 18 g 3    ibuprofen (ADVIL;MOTRIN) 200 MG tablet take 1 tablet by mouth every 6 hours if needed for pain 120 tablet 3    albuterol sulfate HFA (PROAIR HFA) 108 (90 Base) MCG/ACT inhaler inhale 2 puffs by mouth and INTO THE LUNGS every 6 hours if needed for wheezing 8.5 g 5    budesonide-formoterol (SYMBICORT) 160-4.5 MCG/ACT AERO Inhale 2 puffs into the lungs 2 times daily 10.2 g 5    tiotropium (SPIRIVA HANDIHALER) 18 MCG inhalation capsule Inhale 1 capsule into the lungs daily 90 capsule 1    fluticasone (FLONASE) 50 MCG/ACT nasal spray 1 spray by Each Nostril route daily 1 Spray in each nostril 2 each 1    loratadine (RA LORATADINE) 10 MG tablet take 1 tablet by mouth once daily 30 tablet 2    acetaminophen (APAP EXTRA STRENGTH) 500 MG tablet Take 1 tablet by mouth every 6 hours as needed for Pain 30 tablet 1    omeprazole (PRILOSEC) 20 MG delayed release capsule Take 1 capsule by mouth 2 times daily as needed (FOR gerd) 180 capsule 1    Misc. Devices (WRIST BRACE) MISC Use at night for carpal tunnel relief 1 each 0    Blood Pressure KIT 1 each by Does not apply route daily 1 kit 0     No current facility-administered medications for this visit. Patient Care Team:  Osiris Cast MD as PCP - General (Internal Medicine)  Osiris Cast MD as PCP - Empaneled Provider    PAST MEDICAL AND SURGICAL HISTORY:      Past Medical History:   Diagnosis Date    Allergic rhinitis 7/18/2014    Centrilobular emphysema ([address] 5/2/2019    Tobacco abuse      Past Surgical History:   Procedure Laterality Date    TUBAL LIGATION         SOCIAL HISTORY      Social History     Tobacco Use    Smoking status: Every Day     Packs/day: 1.00     Years: 35.00     Pack years: 35.00     Types: Cigarettes    Smokeless tobacco: Never   Substance Use Topics    Alcohol use: Yes     Alcohol/week: 6.0 standard drinks     Types: 6 Cans of beer per week     Gabby Steele  reports that she has been smoking cigarettes. She has a 35.00 pack-year smoking history. She has never used smokeless tobacco.    FAMILY HISTORY:      Family History   Problem Relation Age of Onset    High Blood Pressure Mother     Heart Disease Mother     Coronary Art Dis Mother     Stroke Father        REVIEW OF SYSTEMS:    Review of Systems   Constitutional: Negative. HENT:  Positive for rhinorrhea. Eyes:  Positive for discharge. Respiratory:  Positive for cough. Cardiovascular: Negative. Gastrointestinal:  Positive for nausea.    Endocrine: Negative. Genitourinary: Negative. Musculoskeletal: Negative. Skin: Negative. Allergic/Immunologic: Positive for environmental allergies. Neurological: Negative. Hematological: Negative. Psychiatric/Behavioral: Negative.          PHYSICAL EXAM:      Vitals:    03/09/23 1051   BP: 138/86   Pulse:    Temp:      BP Readings from Last 3 Encounters:   03/09/23 138/86   09/16/21 124/86   08/31/21 126/83       Physical Examination: General appearance - alert, well appearing, and in no distress  Mental status - alert, oriented to person, place, and time  Chest - clear to auscultation, no wheezes, rales or rhonchi, symmetric air entry  Heart - normal rate and regular rhythm  Abdomen - soft, nontender, nondistended,   Neurological - alert, oriented, normal speech, no focal findings or movement disorder noted  Musculoskeletal - no joint tenderness, deformity or swelling  Extremities - no pedal edema noted    LABORATORY FINDINGS:    CBC:   Lab Results   Component Value Date/Time    WBC 12.1 05/06/2019 09:33 PM    HGB 12.7 05/06/2019 09:33 PM     05/06/2019 09:33 PM     BMP:    Lab Results   Component Value Date/Time     09/02/2020 09:44 AM    K 4.5 09/02/2020 09:44 AM     09/02/2020 09:44 AM    CO2 21 09/02/2020 09:44 AM    BUN 13 09/02/2020 09:44 AM    CREATININE 0.83 09/02/2020 09:44 AM    GLUCOSE 79 09/02/2020 09:44 AM     Hemoglobin A1C: No results found for: LABA1C  Microalbumin Urine: No results found for: MICROALBUR  Lipid profile:   Lab Results   Component Value Date/Time    CHOL 212 09/02/2020 09:44 AM    TRIG 129 09/02/2020 09:44 AM     09/02/2020 09:44 AM     Thyroid functions: No results found for: T4, TSH   Hepatic functions:   Lab Results   Component Value Date/Time    ALT 22 09/02/2020 09:44 AM    AST 47 09/02/2020 09:44 AM    PROT 7.9 09/02/2020 09:44 AM    BILITOT 0.26 09/02/2020 09:44 AM    BILIDIR <0.08 05/06/2019 09:33 PM    LABALBU 4.5 09/02/2020 09:44 AM Urine Analysis: No results found for: [id number] [address] Ravi:      Health Maintenance Due   Topic Date Due    COVID-19 Vaccine (4 - Booster for Moderna series) 04/05/2022    Colorectal Cancer Screen  05/11/2022    Flu vaccine (1) 08/01/2022    Low dose CT lung screening  08/19/2022    Depression Screen  03/17/2023       ASSESSMENT AND PLAN:       Diagnosis Orders   1. Seasonal allergic rhinitis due to other allergic trigger  loratadine (RA LORATADINE) 10 MG tablet      2. Other headache syndrome  acetaminophen (APAP EXTRA STRENGTH) 500 MG tablet    ibuprofen (ADVIL;MOTRIN) 800 MG tablet    Comprehensive Metabolic Panel    Lipid Panel    TSH with Reflex      3. Colon cancer screening  Fecal DNA Colorectal cancer screening (Cologuard)      4. Tobacco abuse        5. Leukocytosis, unspecified type  CBC with Auto Differential         we will obtain blood work. And have patient follow-up in 6 months smoking cessation counseling given patient is working to reduce tobacco consumption    FOLLOW UP:   1. Amy Villagomez received counseling on the following healthy behaviors: nutrition and exercise    2. Reviewed prior labs and health maintenance. 3.  Discussed use, benefit, and side effects of prescribed medications. Barriers to medication compliance addressed. All patient questions answered. Pt voiced understanding. 4.  Continue current medications, diet and exercise.               Orders Placed This Encounter   Medications    acetaminophen (APAP EXTRA STRENGTH) 500 MG tablet     Sig: Take 1 tablet by mouth every 6 hours as needed for Pain     Dispense:  30 tablet     Refill:  1    loratadine (RA LORATADINE) 10 MG tablet     Sig: take 1 tablet by mouth once daily     Dispense:  30 tablet     Refill:  2    ibuprofen (ADVIL;MOTRIN) 800 MG tablet     Sig: take 1 tablet by mouth every 6 to 8 hours if needed for pain     Dispense:  20 tablet     Refill:  0          Completed Refills               Requested Prescriptions     Signed Prescriptions Disp Refills    acetaminophen (APAP EXTRA STRENGTH) 500 MG tablet 30 tablet 1     Sig: Take 1 tablet by mouth every 6 hours as needed for Pain    loratadine (RA LORATADINE) 10 MG tablet 30 tablet 2     Sig: take 1 tablet by mouth once daily    ibuprofen (ADVIL;MOTRIN) 800 MG tablet 20 tablet 0     Sig: take 1 tablet by mouth every 6 to 8 hours if needed for pain       5. Patient given educational materials - see patient instructions    6. Was a self-tracking handout given in paper form or via [employer]? Yes  If yes, see orders or list here. Orders Placed This Encounter   Procedures    Fecal DNA Colorectal cancer screening (Cologuard)    CBC with Auto Differential     Please get this labwork done before your next visit. Standing Status:   Future     Standing Expiration Date:   3/8/2024    Comprehensive Metabolic Panel     Please get this labwork done before your next visit. Standing Status:   Future     Standing Expiration Date:   3/7/2024    Lipid Panel     Standing Status:   Future     Standing Expiration Date:   3/8/2024    TSH with Reflex     Please get this labwork done before your next visit. Standing Status:   Future     Standing Expiration Date:   3/8/2024       Return in about 6 months (around 9/9/2023). Patient voiced understanding and agreed to treatment plan. This note is created with the assistance of a speech-recognition program. While intending to generate a document that actually reflects the content of the visit, the document can still have some mistakes which may not have been identified and corrected by editing.       Dr Batool Worley MD, [address]  Associate , Department of Internal Medicine  Resident Ambulatory Site Medical Director  [address] [place] [address] Internal Medicine  [address] [place], [place] [address] Floor  Internal Medicine Clerkship - Beni Alberto                   3/9/2023, 11:25 AM

## 2023-03-09 NOTE — PATIENT INSTRUCTIONS
An order for cologuard was placed by your physician, the company will be contacting within the next 1-2 weeks and will mail the test. Please contact the clinic at [phone] if not heard from in 3 week

## 2023-04-02 LAB — NONINV COLON CA DNA+OCC BLD SCRN STL QL: NEGATIVE

## 2023-06-20 ENCOUNTER — TELEMEDICINE (OUTPATIENT)
Dept: INTERNAL MEDICINE | Age: 58
End: 2023-06-20

## 2023-06-20 DIAGNOSIS — J30.89 SEASONAL ALLERGIC RHINITIS DUE TO OTHER ALLERGIC TRIGGER: ICD-10-CM

## 2023-06-20 DIAGNOSIS — T14.8XXA MUSCLE STRAIN: Primary | ICD-10-CM

## 2023-06-20 RX ORDER — CYCLOBENZAPRINE HCL 5 MG
5 TABLET ORAL 3 TIMES DAILY PRN
Qty: 30 TABLET | Refills: 0 | Status: SHIPPED | OUTPATIENT
Start: 2023-06-20 | End: 2023-06-30

## 2023-06-20 RX ORDER — LORATADINE 10 MG/1
TABLET ORAL
Qty: 30 TABLET | Refills: 2 | Status: SHIPPED | OUTPATIENT
Start: 2023-06-20

## 2023-06-20 NOTE — PROGRESS NOTES
Nabor Dove is a 61 y.o. female evaluated via telephone on 6/20/2023 for Chest Pain (Patient states had pain for 1 week)  . Documentation:  Pain under the rib on right side that started about 1 week ago. It hurts with deep breathing. She reports about the time it began, patient reports she was sitting in the car and turned backwards counterclockwise but at the time she did it she did not feel any injury however since that time she has been feeling the pull sensation below her ribs on the right side. Diagnosis Orders   1. Muscle strain  cyclobenzaprine (FLEXERIL) 5 MG tablet      2. Seasonal allergic rhinitis due to other allergic trigger  loratadine (RA LORATADINE) 10 MG tablet            Total Time: minutes: 11-20 minutes    Nabor Dove was evaluated through a synchronous (real-time) audio encounter. Patient identification was verified at the start of the visit. She (or guardian if applicable) is aware that this is a billable service, which includes applicable co-pays. This visit was conducted with the patient's (and/or legal guardian's) verbal consent. She has not had a related appointment within my department in the past 7 days or scheduled within the next 24 hours. The patient was located at Home: [address] [place] [address] [id number] [place] [id number]. The provider was located at [facility] (Appt Dept): [address] [facility],  [address] [place] [address].     Note: not billable if this call serves to triage the patient into an appointment for the relevant concern    Leonela Aguirre MD

## 2023-07-18 DIAGNOSIS — G44.89 OTHER HEADACHE SYNDROME: ICD-10-CM

## 2023-07-18 DIAGNOSIS — T14.8XXA MUSCLE STRAIN: ICD-10-CM

## 2023-07-19 NOTE — TELEPHONE ENCOUNTER
E-scribe request from [address] [place] [address] for Ibuprofen 800 mg and Cyclobenzaprine 5 mg. Patient is on the waitlist for an appt in September. Health Maintenance   Topic Date Due    COVID-19 Vaccine (5 - Booster for Moderna series) 04/06/2022    Low dose CT lung screening &/or counseling  08/19/2022    Flu vaccine (1) 08/01/2023    Breast cancer screen  11/02/2023    Depression Screen  03/09/2024    DTaP/Tdap/Td vaccine (2 - Td or Tdap) 11/16/2024    Lipids  09/02/2025    Colorectal Cancer Screen  03/24/2026    Cervical cancer screen  09/16/2026    Shingles vaccine  Completed    Pneumococcal 0-64 years Vaccine  Completed    Hepatitis C screen  Completed    HIV screen  Completed    Hepatitis A vaccine  Aged Out    Hib vaccine  Aged Out    Meningococcal (ACWY) vaccine  Aged Out             (applicable per patient's age: Cancer Screenings, Depression Screening, Fall Risk Screening, Immunizations)    LDL Cholesterol (mg/dL)   Date Value   09/02/2020 82     AST (U/L)   Date Value   09/02/2020 47 (H)     ALT (U/L)   Date Value   09/02/2020 22     BUN (mg/dL)   Date Value   09/02/2020 13      (goal A1C is < 7)   (goal LDL is <100) need 30-50% reduction from baseline     BP Readings from Last 3 Encounters:   03/09/23 138/86   09/16/21 124/86   08/31/21 126/83    (goal /80)      All Future Testing planned in [facility]:  Lab Frequency Next Occurrence   CT lung screen [Initial/Annual] Once 07/21/2022   CBC with Auto Differential Once 06/17/2023   Comprehensive Metabolic Panel Once 69/18/1483   Lipid Panel Once 06/17/2023   TSH with Reflex Once 06/17/2023       Next Visit Date:  No future appointments.          Patient Active Problem List:     Tobacco abuse     Allergic rhinitis     Centrilobular emphysema ([address])

## 2023-07-20 RX ORDER — CYCLOBENZAPRINE HCL 5 MG
TABLET ORAL
Qty: 30 TABLET | Refills: 0 | Status: SHIPPED | OUTPATIENT
Start: 2023-07-20

## 2023-07-20 RX ORDER — IBUPROFEN 800 MG/1
TABLET ORAL
Qty: 20 TABLET | Refills: 0 | Status: SHIPPED | OUTPATIENT
Start: 2023-07-20

## 2023-08-30 ENCOUNTER — TELEPHONE (OUTPATIENT)
Dept: INTERNAL MEDICINE | Age: 58
End: 2023-08-30

## 2023-09-26 ENCOUNTER — TELEPHONE (OUTPATIENT)
Dept: INTERNAL MEDICINE | Age: 58
End: 2023-09-26

## 2023-09-26 NOTE — TELEPHONE ENCOUNTER
LVM for patient to call and schedule an appt. Letter mailed to call the office and schedule an appt.

## 2023-10-24 DIAGNOSIS — J43.2 CENTRILOBULAR EMPHYSEMA (HCC): ICD-10-CM

## 2023-10-24 DIAGNOSIS — J44.9 COPD, SEVERE (HCC): ICD-10-CM

## 2023-10-24 RX ORDER — TIOTROPIUM BROMIDE 18 UG/1
CAPSULE ORAL; RESPIRATORY (INHALATION)
Qty: 90 CAPSULE | Refills: 1 | Status: SHIPPED | OUTPATIENT
Start: 2023-10-24

## 2023-10-24 RX ORDER — BUDESONIDE AND FORMOTEROL FUMARATE DIHYDRATE 160; 4.5 UG/1; UG/1
AEROSOL RESPIRATORY (INHALATION)
Qty: 10.2 G | Refills: 5 | Status: SHIPPED | OUTPATIENT
Start: 2023-10-24

## 2023-10-24 RX ORDER — ALBUTEROL SULFATE 90 UG/1
AEROSOL, METERED RESPIRATORY (INHALATION)
Qty: 25.5 G | Refills: 3 | Status: SHIPPED | OUTPATIENT
Start: 2023-10-24

## 2023-10-24 NOTE — TELEPHONE ENCOUNTER
.. Request for   Requested Prescriptions     Pending Prescriptions Disp Refills    SYMBICORT 160-4.5 MCG/ACT AERO [Pharmacy Med Name: SYMBICORT 160-4.5 MCG INHALER] 10.2 g 5     Sig: inhale 2 puffs by mouth twice a day    Ludwig Makua 18 MCG inhalation capsule [Pharmacy Med Name: Ludwig Makua 18 MCG CAP] 90 capsule 1     Sig: inhale contents of 1 capsule by mouth once daily    albuterol sulfate HFA (PROVENTIL;VENTOLIN;PROAIR) 108 (90 Base) MCG/ACT inhaler [Pharmacy Med Name: ALBUTEROL HFA 90 MCG INHALER] 25.5 g      Sig: inhale 2 puffs by mouth and INTO THE LUNGS every 6 hours if needed for wheezing    . Please review and e-scribe to pharmacy listed in chart if appropriate. Thank you. Last Visit Date: 6/20/2023  Next Visit Date: Visit date not found    No future appointments.     Health Maintenance   Topic Date Due    Hepatitis B vaccine (1 of 3 - 3-dose series) Never done    Pneumococcal 0-64 years Vaccine (2 - PCV) 03/26/2020    COVID-19 Vaccine (5 - Moderna series) 04/06/2022    Low dose CT lung screening &/or counseling  08/19/2022    Flu vaccine (1) 08/01/2023    Breast cancer screen  11/02/2023    Depression Screen  03/09/2024    DTaP/Tdap/Td vaccine (2 - Td or Tdap) 11/16/2024    Lipids  09/02/2025    Colorectal Cancer Screen  03/24/2026    Cervical cancer screen  09/16/2026    Shingles vaccine  Completed    Hepatitis C screen  Completed    HIV screen  Completed    Hepatitis A vaccine  Aged Out    Hib vaccine  Aged Out    Meningococcal (ACWY) vaccine  Aged Out       No results found for: \"LABA1C\"          ( goal A1C is < 7)   No components found for: \"LABMICR\"  LDL Cholesterol (mg/dL)   Date Value   09/02/2020 82       (goal LDL is <100)   AST (U/L)   Date Value   09/02/2020 47 (H)     ALT (U/L)   Date Value   09/02/2020 22     BUN (mg/dL)   Date Value   09/02/2020 13     BP Readings from Last 3 Encounters:   03/09/23 138/86   09/16/21 124/86   08/31/21 126/83          (goal 120/80)    All

## 2024-01-26 DIAGNOSIS — G44.89 OTHER HEADACHE SYNDROME: ICD-10-CM

## 2024-01-26 RX ORDER — IBUPROFEN 800 MG/1
TABLET ORAL
Qty: 20 TABLET | Refills: 0 | OUTPATIENT
Start: 2024-01-26

## 2024-04-30 DIAGNOSIS — J43.2 CENTRILOBULAR EMPHYSEMA (HCC): ICD-10-CM

## 2024-04-30 RX ORDER — TIOTROPIUM BROMIDE 18 UG/1
CAPSULE ORAL; RESPIRATORY (INHALATION)
Qty: 90 CAPSULE | Refills: 1 | OUTPATIENT
Start: 2024-04-30

## 2024-04-30 NOTE — TELEPHONE ENCOUNTER
Several phone calls have been made for patient to make an appt.  Letter was mailed on 9/26/23 to call and schedule an appt.  Letter mailed on 9/23/23 to call and schedule an appt.      Last visit: 6/20/23  Last Med refill: 10/24/23  Does patient have enough medication for 72 hours: No:     Next Visit Date: none  No future appointments.    Health Maintenance   Topic Date Due    Hepatitis B vaccine (1 of 3 - 3-dose series) Never done    Pneumococcal 0-64 years Vaccine (2 of 2 - PCV) 03/26/2020    Low dose CT lung screening &/or counseling  08/19/2022    COVID-19 Vaccine (5 - 2023-24 season) 09/01/2023    Breast cancer screen  11/02/2023    Depression Screen  03/09/2024    Flu vaccine (Season Ended) 08/01/2024    DTaP/Tdap/Td vaccine (2 - Td or Tdap) 11/16/2024    Lipids  09/02/2025    Colorectal Cancer Screen  03/24/2026    Cervical cancer screen  09/16/2026    Shingles vaccine  Completed    Hepatitis C screen  Completed    HIV screen  Completed    Hepatitis A vaccine  Aged Out    Hib vaccine  Aged Out    Polio vaccine  Aged Out    Meningococcal (ACWY) vaccine  Aged Out       No results found for: \"LABA1C\"          ( goal A1C is < 7)   No components found for: \"LABMICR\"  LDL Cholesterol (mg/dL)   Date Value   09/02/2020 82       (goal LDL is <100)   AST (U/L)   Date Value   09/02/2020 47 (H)     ALT (U/L)   Date Value   09/02/2020 22     BUN (mg/dL)   Date Value   09/02/2020 13     BP Readings from Last 3 Encounters:   03/09/23 138/86   09/16/21 124/86   08/31/21 126/83          (goal 120/80)    All Future Testing planned in [facility]  Lab Frequency Next Occurrence               Patient Active Problem List:     Tobacco abuse     Allergic rhinitis     Centrilobular emphysema (HCC)

## 2024-05-16 DIAGNOSIS — J43.2 CENTRILOBULAR EMPHYSEMA (HCC): ICD-10-CM

## 2024-05-16 NOTE — TELEPHONE ENCOUNTER
Marlen Montes is calling to request a refill on the following medication(s):    Medication Request:  Requested Prescriptions     Pending Prescriptions Disp Refills    SPIRIVA HANDIHALER 18 MCG inhalation capsule [Pharmacy Med Name: SPIRIVA HANDIHALER 18 MCG CAP] 90 capsule 1     Sig: inhale contents of 1 capsule by mouth once daily       Last Visit Date (If Applicable):  6/20/2023    Next Visit Date:    Visit date not found

## 2024-05-17 ENCOUNTER — TELEPHONE (OUTPATIENT)
Dept: INTERNAL MEDICINE | Age: 59
End: 2024-05-17

## 2024-05-17 DIAGNOSIS — J43.2 CENTRILOBULAR EMPHYSEMA (HCC): ICD-10-CM

## 2024-05-17 RX ORDER — TIOTROPIUM BROMIDE 18 UG/1
CAPSULE ORAL; RESPIRATORY (INHALATION)
Qty: 90 CAPSULE | Refills: 1 | Status: SHIPPED | OUTPATIENT
Start: 2024-05-17

## 2024-05-17 RX ORDER — TIOTROPIUM BROMIDE 18 UG/1
CAPSULE ORAL; RESPIRATORY (INHALATION)
Qty: 90 CAPSULE | Refills: 1 | Status: SHIPPED | OUTPATIENT
Start: 2024-05-17 | End: 2024-05-17 | Stop reason: SDUPTHER

## 2024-05-17 NOTE — TELEPHONE ENCOUNTER
Received request for PA in Epic for Spiriva Handihaler.    Pt's insurance prefers brand name, new script sent with CHRISTOPHE marked.

## 2024-07-23 DIAGNOSIS — G44.89 OTHER HEADACHE SYNDROME: ICD-10-CM

## 2024-07-23 DIAGNOSIS — T14.8XXA MUSCLE STRAIN: ICD-10-CM

## 2024-07-23 RX ORDER — IBUPROFEN 800 MG/1
TABLET ORAL
Qty: 20 TABLET | Refills: 0 | Status: SHIPPED | OUTPATIENT
Start: 2024-07-23

## 2024-07-23 RX ORDER — CYCLOBENZAPRINE HCL 5 MG
5 TABLET ORAL 3 TIMES DAILY PRN
Qty: 30 TABLET | Refills: 0 | Status: SHIPPED | OUTPATIENT
Start: 2024-07-23 | End: 2024-08-02

## 2024-07-23 NOTE — TELEPHONE ENCOUNTER
Marlen Montes is calling to request a refill on the following medication(s):    Medication Request:  Requested Prescriptions     Pending Prescriptions Disp Refills    ibuprofen (ADVIL;MOTRIN) 800 MG tablet 20 tablet 0     Sig: take 1 tablet by mouth every 6 to 8 hours AS NEEDED FOR PAIN    cyclobenzaprine (FLEXERIL) 5 MG tablet 30 tablet 0     Sig: Take 1 tablet by mouth 3 times daily as needed for Muscle spasms       Last Visit Date (If Applicable):  6/20/2023    Next Visit Date:    Visit date not found

## 2024-07-25 DIAGNOSIS — J43.2 CENTRILOBULAR EMPHYSEMA (HCC): ICD-10-CM

## 2024-07-26 RX ORDER — BUDESONIDE AND FORMOTEROL FUMARATE DIHYDRATE 160; 4.5 UG/1; UG/1
2 AEROSOL RESPIRATORY (INHALATION) 2 TIMES DAILY
Qty: 10.2 G | Refills: 5 | OUTPATIENT
Start: 2024-07-26

## 2024-07-26 NOTE — TELEPHONE ENCOUNTER
Patient hasn't been seen since 6/20/23.  Made several attempts to contact her and a letter was mailed to schedule an appt.

## 2024-07-26 NOTE — TELEPHONE ENCOUNTER
Marlen Montes is calling to request a refill on the following medication(s):    Medication Request:  Requested Prescriptions     Pending Prescriptions Disp Refills    budesonide-formoterol (SYMBICORT) 160-4.5 MCG/ACT AERO [Pharmacy Med Name: BUDESONIDE/FORM 160/4.5MCG(120 INH)] 10.2 g 5     Sig: INHALE 2 PUFFS BY MOUTH TWICE A DAY       Last Visit Date (If Applicable):  6/20/2023    Next Visit Date:    Visit date not found

## 2024-08-20 DIAGNOSIS — J43.2 CENTRILOBULAR EMPHYSEMA (HCC): ICD-10-CM

## 2024-08-20 RX ORDER — BUDESONIDE AND FORMOTEROL FUMARATE DIHYDRATE 160; 4.5 UG/1; UG/1
2 AEROSOL RESPIRATORY (INHALATION) 2 TIMES DAILY
Qty: 10.2 G | Refills: 5 | Status: SHIPPED | OUTPATIENT
Start: 2024-08-20

## 2024-08-20 NOTE — TELEPHONE ENCOUNTER
..Request for   Requested Prescriptions     Pending Prescriptions Disp Refills    budesonide-formoterol (SYMBICORT) 160-4.5 MCG/ACT AERO 10.2 g 5     Sig: Inhale 2 puffs into the lungs 2 times daily    .      Please review and e-scribe to pharmacy listed in chart if appropriate. Thank you.      Last Visit Date: 6/20/2023  Next Visit Date: Visit date not found    No future appointments.    Health Maintenance   Topic Date Due    Pneumococcal 0-64 years Vaccine (2 of 2 - PCV) 03/26/2020    Lung Cancer Screening &/or Counseling  08/19/2022    COVID-19 Vaccine (5 - 2023-24 season) 09/01/2023    Breast cancer screen  11/02/2023    Depression Screen  03/09/2024    Flu vaccine (1) 08/01/2024    Respiratory Syncytial Virus (RSV) Pregnant or age 60 yrs+ (1 - 1-dose 60+ series) Never done    DTaP/Tdap/Td vaccine (2 - Td or Tdap) 11/16/2024    Lipids  09/02/2025    Colorectal Cancer Screen  03/24/2026    Cervical cancer screen  09/16/2026    Shingles vaccine  Completed    Hepatitis C screen  Completed    HIV screen  Completed    Hepatitis A vaccine  Aged Out    Hepatitis B vaccine  Aged Out    Hib vaccine  Aged Out    Polio vaccine  Aged Out    Meningococcal (ACWY) vaccine  Aged Out       No results found for: \"LABA1C\"          ( goal A1C is < 7)   No components found for: \"LABMICR\"  No components found for: \"LDLCHOLESTEROL\", \"LDLCALC\"    (goal LDL is <100)   AST (U/L)   Date Value   09/02/2020 47 (H)     ALT (U/L)   Date Value   09/02/2020 22     BUN (mg/dL)   Date Value   09/02/2020 13     BP Readings from Last 3 Encounters:   03/09/23 138/86   09/16/21 124/86   08/31/21 126/83          (goal 120/80)    All Future Testing planned in [facility]  Lab Frequency Next Occurrence         Patient Active Problem List:     Tobacco abuse     Allergic rhinitis     Centrilobular emphysema (HCC)

## 2024-09-03 ENCOUNTER — HOSPITAL ENCOUNTER (OUTPATIENT)
Age: 59
Setting detail: SPECIMEN
Discharge: HOME | End: 2024-09-03
Payer: MEDICAID

## 2024-09-03 ENCOUNTER — OFFICE VISIT (OUTPATIENT)
Dept: INTERNAL MEDICINE | Age: 59
End: 2024-09-03
Payer: MEDICAID

## 2024-09-03 VITALS
HEART RATE: 94 BPM | SYSTOLIC BLOOD PRESSURE: 120 MMHG | DIASTOLIC BLOOD PRESSURE: 80 MMHG | BODY MASS INDEX: 16.83 KG/M2 | HEIGHT: 64 IN | TEMPERATURE: 97.9 F | OXYGEN SATURATION: 98 % | WEIGHT: 98.6 LBS

## 2024-09-03 DIAGNOSIS — J43.2 CENTRILOBULAR EMPHYSEMA (HCC): ICD-10-CM

## 2024-09-03 DIAGNOSIS — R20.2 PARESTHESIA: ICD-10-CM

## 2024-09-03 DIAGNOSIS — J30.89 SEASONAL ALLERGIC RHINITIS DUE TO OTHER ALLERGIC TRIGGER: ICD-10-CM

## 2024-09-03 DIAGNOSIS — K21.9 GASTROESOPHAGEAL REFLUX DISEASE WITHOUT ESOPHAGITIS: ICD-10-CM

## 2024-09-03 DIAGNOSIS — R20.2 PARESTHESIA: Primary | ICD-10-CM

## 2024-09-03 LAB
BASOPHILS # BLD: 0.04 K/UL (ref 0–0.2)
BASOPHILS NFR BLD: 1 % (ref 0–2)
EOSINOPHIL # BLD: 0.15 K/UL (ref 0–0.44)
EOSINOPHILS RELATIVE PERCENT: 2 % (ref 1–4)
ERYTHROCYTE [DISTWIDTH] IN BLOOD BY AUTOMATED COUNT: 14.6 % (ref 11.8–14.4)
HCT VFR BLD AUTO: 46.2 % (ref 36.3–47.1)
HGB BLD-MCNC: 14.6 G/DL (ref 11.9–15.1)
IMM GRANULOCYTES # BLD AUTO: <0.03 K/UL (ref 0–0.3)
IMM GRANULOCYTES NFR BLD: 0 %
LYMPHOCYTES NFR BLD: 0.93 K/UL (ref 1.1–3.7)
LYMPHOCYTES RELATIVE PERCENT: 13 % (ref 24–43)
MCH RBC QN AUTO: 28.5 PG (ref 25.2–33.5)
MCHC RBC AUTO-ENTMCNC: 31.6 G/DL (ref 28.4–34.8)
MCV RBC AUTO: 90.1 FL (ref 82.6–102.9)
MONOCYTES NFR BLD: 0.49 K/UL (ref 0.1–1.2)
MONOCYTES NFR BLD: 7 % (ref 3–12)
NEUTROPHILS NFR BLD: 77 % (ref 36–65)
NEUTS SEG NFR BLD: 5.53 K/UL (ref 1.5–8.1)
NRBC BLD-RTO: 0 PER 100 WBC
PLATELET # BLD AUTO: 299 K/UL (ref 138–453)
PMV BLD AUTO: 11 FL (ref 8.1–13.5)
RBC # BLD AUTO: 5.13 M/UL (ref 3.95–5.11)
RBC # BLD: ABNORMAL 10*6/UL
WBC OTHER # BLD: 7.2 K/UL (ref 3.5–11.3)

## 2024-09-03 PROCEDURE — 4004F PT TOBACCO SCREEN RCVD TLK: CPT | Performed by: INTERNAL MEDICINE

## 2024-09-03 PROCEDURE — 36415 COLL VENOUS BLD VENIPUNCTURE: CPT

## 2024-09-03 PROCEDURE — 85025 COMPLETE CBC W/AUTO DIFF WBC: CPT

## 2024-09-03 PROCEDURE — G8427 DOCREV CUR MEDS BY ELIG CLIN: HCPCS | Performed by: INTERNAL MEDICINE

## 2024-09-03 PROCEDURE — G8419 CALC BMI OUT NRM PARAM NOF/U: HCPCS | Performed by: INTERNAL MEDICINE

## 2024-09-03 PROCEDURE — 80053 COMPREHEN METABOLIC PANEL: CPT

## 2024-09-03 PROCEDURE — 3023F SPIROM DOC REV: CPT | Performed by: INTERNAL MEDICINE

## 2024-09-03 PROCEDURE — 99213 OFFICE O/P EST LOW 20 MIN: CPT | Performed by: INTERNAL MEDICINE

## 2024-09-03 PROCEDURE — 82306 VITAMIN D 25 HYDROXY: CPT

## 2024-09-03 PROCEDURE — 99214 OFFICE O/P EST MOD 30 MIN: CPT | Performed by: INTERNAL MEDICINE

## 2024-09-03 PROCEDURE — 3017F COLORECTAL CA SCREEN DOC REV: CPT | Performed by: INTERNAL MEDICINE

## 2024-09-03 PROCEDURE — 84443 ASSAY THYROID STIM HORMONE: CPT

## 2024-09-03 RX ORDER — LORATADINE 10 MG/1
TABLET ORAL
Qty: 30 TABLET | Refills: 2 | Status: SHIPPED | OUTPATIENT
Start: 2024-09-03

## 2024-09-03 ASSESSMENT — PATIENT HEALTH QUESTIONNAIRE - PHQ9
SUM OF ALL RESPONSES TO PHQ QUESTIONS 1-9: 2
SUM OF ALL RESPONSES TO PHQ QUESTIONS 1-9: 2
1. LITTLE INTEREST OR PLEASURE IN DOING THINGS: MORE THAN HALF THE DAYS
SUM OF ALL RESPONSES TO PHQ QUESTIONS 1-9: 2
SUM OF ALL RESPONSES TO PHQ QUESTIONS 1-9: 2
2. FEELING DOWN, DEPRESSED OR HOPELESS: NOT AT ALL
SUM OF ALL RESPONSES TO PHQ9 QUESTIONS 1 & 2: 2

## 2024-09-03 ASSESSMENT — ENCOUNTER SYMPTOMS
EYES NEGATIVE: 1
COUGH: 1

## 2024-09-03 NOTE — PROGRESS NOTES
[facility]   Progress Note        Date of patient's visit: 9/3/2024  Patient's Name:  Marlen Montes                   YOB: 1965        PCP:  Diann Paez MD    Marlen Montes is a 59 y.o. female who presents for   Chief Complaint   Patient presents with    Check-Up     Yearly check up    and follow up of chronic medical problems.  Patient Active Problem List   Diagnosis    Tobacco abuse    Allergic rhinitis    Centrilobular emphysema (HCC)       HISTORY OF PRESENT ILLNESS:    History was obtained from the patient.   HARI Ramsey complains of heartburn. This has been associated with cough, early satiety, epigastric pain, and need to clear throat frequently.  She denies belching, chest pain, and choking on food. Symptoms have been present for several weeks. She denies dysphagia. She has not lost weight. She denies melena, hematochezia, hematemesis, and coffee ground emesis. Medical therapy in the past has included none. Ha been on Prilosec in the past.  Reports that she does enjoy eating Chinese food with moderate levels of spice, she has also continued to smoke and uses Motrin regularly.    COPD:  Current treatment includes short-acting beta agonist inhaler, LABA and antichoinergic inhaler.  Residual symptoms: none.  She denies any other symptoms. She requires her rescue inhaler 0 time(s) per day.continues to smoke.     Patient's allergies, medications, past medical, surgical, social and family histories were reviewed and updated as appropriate.    ALLERGIES    No Known Allergies      MEDICATIONS:      Current Outpatient Medications   Medication Sig Dispense Refill    budesonide-formoterol (SYMBICORT) 160-4.5 MCG/ACT AERO Inhale 2 puffs into the lungs 2 times daily 10.2 g 5    ibuprofen (ADVIL;MOTRIN) 800 MG tablet take 1 tablet by mouth every 6 to 8 hours AS NEEDED FOR PAIN 20 tablet 0    SPIRIVA HANDIHALER 18 MCG inhalation capsule inhale contents of 1 capsule by mouth

## 2024-09-04 LAB
25(OH)D3 SERPL-MCNC: 13.7 NG/ML (ref 30–100)
ALBUMIN SERPL-MCNC: 4.5 G/DL (ref 3.5–5.2)
ALBUMIN/GLOB SERPL: 1 {RATIO} (ref 1–2.5)
ALP SERPL-CCNC: 113 U/L (ref 35–104)
ALT SERPL-CCNC: 39 U/L (ref 10–35)
ANION GAP SERPL CALCULATED.3IONS-SCNC: 12 MMOL/L (ref 9–16)
AST SERPL-CCNC: 93 U/L (ref 10–35)
BILIRUB SERPL-MCNC: 0.2 MG/DL (ref 0–1.2)
BUN SERPL-MCNC: 10 MG/DL (ref 8–23)
CALCIUM SERPL-MCNC: 10.1 MG/DL (ref 8.6–10.4)
CHLORIDE SERPL-SCNC: 102 MMOL/L (ref 98–107)
CO2 SERPL-SCNC: 26 MMOL/L (ref 20–31)
CREAT SERPL-MCNC: 0.8 MG/DL (ref 0.5–0.9)
GFR, ESTIMATED: 84 ML/MIN/1.73M2
GLUCOSE SERPL-MCNC: 69 MG/DL (ref 74–99)
POTASSIUM SERPL-SCNC: 4.5 MMOL/L (ref 3.7–5.3)
PROT SERPL-MCNC: 8.2 G/DL (ref 6.6–8.7)
SODIUM SERPL-SCNC: 140 MMOL/L (ref 136–145)
TSH SERPL DL<=0.05 MIU/L-ACNC: 0.45 UIU/ML (ref 0.27–4.2)

## 2024-09-25 ENCOUNTER — OFFICE VISIT (OUTPATIENT)
Dept: INTERNAL MEDICINE | Age: 59
End: 2024-09-25
Payer: MEDICAID

## 2024-09-25 VITALS
HEIGHT: 64 IN | TEMPERATURE: 99 F | WEIGHT: 98.2 LBS | HEART RATE: 94 BPM | BODY MASS INDEX: 16.76 KG/M2 | OXYGEN SATURATION: 99 % | SYSTOLIC BLOOD PRESSURE: 120 MMHG | DIASTOLIC BLOOD PRESSURE: 88 MMHG

## 2024-09-25 DIAGNOSIS — J43.2 CENTRILOBULAR EMPHYSEMA (HCC): ICD-10-CM

## 2024-09-25 DIAGNOSIS — F10.29 ALCOHOL DEPENDENCE WITH UNSPECIFIED ALCOHOL-INDUCED DISORDER (HCC): ICD-10-CM

## 2024-09-25 DIAGNOSIS — Z72.0 TOBACCO ABUSE: Primary | ICD-10-CM

## 2024-09-25 PROCEDURE — 3023F SPIROM DOC REV: CPT | Performed by: INTERNAL MEDICINE

## 2024-09-25 PROCEDURE — G8427 DOCREV CUR MEDS BY ELIG CLIN: HCPCS | Performed by: INTERNAL MEDICINE

## 2024-09-25 PROCEDURE — 99213 OFFICE O/P EST LOW 20 MIN: CPT | Performed by: INTERNAL MEDICINE

## 2024-09-25 PROCEDURE — 3017F COLORECTAL CA SCREEN DOC REV: CPT | Performed by: INTERNAL MEDICINE

## 2024-09-25 PROCEDURE — 4004F PT TOBACCO SCREEN RCVD TLK: CPT | Performed by: INTERNAL MEDICINE

## 2024-09-25 PROCEDURE — G8419 CALC BMI OUT NRM PARAM NOF/U: HCPCS | Performed by: INTERNAL MEDICINE

## 2024-09-25 RX ORDER — BUDESONIDE AND FORMOTEROL FUMARATE DIHYDRATE 160; 4.5 UG/1; UG/1
2 AEROSOL RESPIRATORY (INHALATION) 2 TIMES DAILY
Qty: 10.2 G | Refills: 5 | Status: SHIPPED | OUTPATIENT
Start: 2024-09-25 | End: 2024-09-26 | Stop reason: SDUPTHER

## 2024-09-25 RX ORDER — FOLIC ACID 1 MG/1
1 TABLET ORAL DAILY
Qty: 90 TABLET | Refills: 1 | Status: SHIPPED | OUTPATIENT
Start: 2024-09-25

## 2024-09-25 SDOH — ECONOMIC STABILITY: FOOD INSECURITY: WITHIN THE PAST 12 MONTHS, THE FOOD YOU BOUGHT JUST DIDN'T LAST AND YOU DIDN'T HAVE MONEY TO GET MORE.: NEVER TRUE

## 2024-09-25 SDOH — ECONOMIC STABILITY: INCOME INSECURITY: HOW HARD IS IT FOR YOU TO PAY FOR THE VERY BASICS LIKE FOOD, HOUSING, MEDICAL CARE, AND HEATING?: NOT HARD AT ALL

## 2024-09-25 ASSESSMENT — ENCOUNTER SYMPTOMS
SINUS PAIN: 0
SINUS PRESSURE: 0
RESPIRATORY NEGATIVE: 1

## 2024-09-26 ENCOUNTER — TELEPHONE (OUTPATIENT)
Dept: INTERNAL MEDICINE | Age: 59
End: 2024-09-26

## 2024-09-26 DIAGNOSIS — J43.2 CENTRILOBULAR EMPHYSEMA (HCC): ICD-10-CM

## 2024-09-26 RX ORDER — BUDESONIDE AND FORMOTEROL FUMARATE DIHYDRATE 160; 4.5 UG/1; UG/1
2 AEROSOL RESPIRATORY (INHALATION) 2 TIMES DAILY
Qty: 10.2 G | Refills: 5 | Status: SHIPPED | OUTPATIENT
Start: 2024-09-26

## 2024-11-15 DIAGNOSIS — J43.2 CENTRILOBULAR EMPHYSEMA (HCC): ICD-10-CM

## 2024-11-15 NOTE — TELEPHONE ENCOUNTER
Marlen Montes is calling to request a refill on the following medication(s):    Medication Request:  Requested Prescriptions     Pending Prescriptions Disp Refills    SPIRIVA HANDIHALER 18 MCG inhalation capsule 90 capsule 1     Sig: inhale contents of 1 capsule by mouth once daily       Last Visit Date (If Applicable):  9/25/2024    Next Visit Date:    12/17/2024

## 2024-11-18 RX ORDER — TIOTROPIUM BROMIDE 18 UG/1
CAPSULE ORAL; RESPIRATORY (INHALATION)
Qty: 90 CAPSULE | Refills: 1 | Status: SHIPPED | OUTPATIENT
Start: 2024-11-18

## 2025-01-20 DIAGNOSIS — K21.9 GASTROESOPHAGEAL REFLUX DISEASE WITHOUT ESOPHAGITIS: ICD-10-CM

## 2025-01-20 DIAGNOSIS — J44.9 COPD, SEVERE (HCC): ICD-10-CM

## 2025-01-20 NOTE — TELEPHONE ENCOUNTER
Request for   Requested Prescriptions     Pending Prescriptions Disp Refills    albuterol sulfate HFA (PROVENTIL;VENTOLIN;PROAIR) 108 (90 Base) MCG/ACT inhaler 18 g 3     Sig: inhale 2 puffs by mouth and INTO THE LUNGS every 6 hours if needed for wheezing    omeprazole (PRILOSEC) 20 MG delayed release capsule 180 capsule 0     Sig: Take 1 capsule by mouth 2 times daily as needed (FOR gerd)    .      Please review and e-scribe to pharmacy listed in chart if appropriate. Thank you.      Last Visit Date: 9/25/2024  Next Visit Date: Visit date not found - PC to pt to schedule appt, no answer. Left HIPAA compliant message identifying self and nature of call, requested call back to writer or office to schedule f/u appt, phone numbers given. Pt also added to PCP wait list.      No future appointments.    Health Maintenance   Topic Date Due    Pneumococcal 0-64 years Vaccine (2 of 2 - PCV) 03/26/2020    Lung Cancer Screening &/or Counseling  08/19/2022    Breast cancer screen  11/02/2023    Flu vaccine (1) 08/01/2024    Respiratory Syncytial Virus (RSV) Pregnant or age 60 yrs+ (1 - Risk 60-74 years 1-dose series) Never done    COVID-19 Vaccine (9 - 2023-24 season) 09/01/2024    DTaP/Tdap/Td vaccine (2 - Td or Tdap) 11/16/2024    Lipids  09/02/2025    Depression Screen  09/03/2025    Colorectal Cancer Screen  03/24/2026    Cervical cancer screen  09/16/2026    Shingles vaccine  Completed    Hepatitis C screen  Completed    HIV screen  Completed    Hepatitis A vaccine  Aged Out    Hepatitis B vaccine  Aged Out    Hib vaccine  Aged Out    Polio vaccine  Aged Out    Meningococcal (ACWY) vaccine  Aged Out       No results found for: \"LABA1C\"          ( goal A1C is < 7)   No components found for: \"LABMICR\"  No components found for: \"LDLCHOLESTEROL\", \"LDLCALC\"    (goal LDL is <100)   AST (U/L)   Date Value   09/03/2024 93 (H)     ALT (U/L)   Date Value   09/03/2024 39 (H)     BUN (mg/dL)   Date Value   09/03/2024 10     BP

## 2025-01-21 RX ORDER — ALBUTEROL SULFATE 90 UG/1
INHALANT RESPIRATORY (INHALATION)
Qty: 18 G | Refills: 3 | Status: SHIPPED | OUTPATIENT
Start: 2025-01-21

## 2025-06-07 DIAGNOSIS — J43.2 CENTRILOBULAR EMPHYSEMA (HCC): ICD-10-CM

## 2025-06-07 DIAGNOSIS — K21.9 GASTROESOPHAGEAL REFLUX DISEASE WITHOUT ESOPHAGITIS: ICD-10-CM

## 2025-06-09 RX ORDER — TIOTROPIUM BROMIDE 18 UG/1
CAPSULE ORAL; RESPIRATORY (INHALATION)
Qty: 90 CAPSULE | Refills: 1 | Status: SHIPPED | OUTPATIENT
Start: 2025-06-09

## 2025-06-09 RX ORDER — OMEPRAZOLE 20 MG/1
CAPSULE, DELAYED RELEASE ORAL
Qty: 180 CAPSULE | Refills: 0 | Status: SHIPPED | OUTPATIENT
Start: 2025-06-09

## 2025-06-09 NOTE — TELEPHONE ENCOUNTER
Marlen Montes is calling to request a refill on the following medication(s):    Medication Request:  Requested Prescriptions     Pending Prescriptions Disp Refills    omeprazole (PRILOSEC) 20 MG delayed release capsule [Pharmacy Med Name: OMEPRAZOLE 20MG CAPSULES] 180 capsule 0     Sig: TAKE 1 CAPSULE BY MOUTH TWICE DAILY AS NEEDED FOR GERD    SPIRIVA HANDIHALER 18 MCG inhalation capsule [Pharmacy Med Name: SPIRIVA 18MCG CAPS 30S & HANDIHALER] 90 capsule 1     Sig: INHALE THE CONTENTS OF 1 CAPSULE VIA INHALATION DEVICE DAILY       Last Visit Date (If Applicable):  Visit date not found    Next Visit Date:    Visit date not found

## 2025-07-17 DIAGNOSIS — J44.9 COPD, SEVERE (HCC): ICD-10-CM

## 2025-07-17 DIAGNOSIS — J43.2 CENTRILOBULAR EMPHYSEMA (HCC): ICD-10-CM

## 2025-07-17 NOTE — TELEPHONE ENCOUNTER
Marlen Montes is calling to request a refill on the following medication(s):    Medication Request:  Requested Prescriptions     Pending Prescriptions Disp Refills    albuterol sulfate HFA (PROVENTIL;VENTOLIN;PROAIR) 108 (90 Base) MCG/ACT inhaler [Pharmacy Med Name: ALBUTEROL HFA INH (200 PUFFS) 18GM] 18 g 3     Sig: INHALE 2 PUFFS BY MOUTH EVERY 6 HOURS AS NEEDED    SYMBICORT 160-4.5 MCG/ACT AERO [Pharmacy Med Name: SYMBICORT 160/4.5MCG (120 ORAL INH)] 10.2 g 5     Sig: INHALE 2 PUFFS INTO THE LUNGS TWICE DAILY       Last Visit Date (If Applicable):  Visit date not found    Next Visit Date:    Visit date not found

## 2025-07-18 RX ORDER — BUDESONIDE AND FORMOTEROL FUMARATE DIHYDRATE 160; 4.5 UG/1; UG/1
2 AEROSOL RESPIRATORY (INHALATION) 2 TIMES DAILY
Qty: 10.2 G | Refills: 5 | Status: SHIPPED | OUTPATIENT
Start: 2025-07-18

## 2025-07-18 RX ORDER — ALBUTEROL SULFATE 90 UG/1
2 INHALANT RESPIRATORY (INHALATION) EVERY 6 HOURS PRN
Qty: 18 G | Refills: 3 | Status: SHIPPED | OUTPATIENT
Start: 2025-07-18

## 2025-08-21 ENCOUNTER — OFFICE VISIT (OUTPATIENT)
Age: 60
End: 2025-08-21

## 2025-08-21 DIAGNOSIS — T61.781A ALLERGIC REACTION TO SHELLFISH: Primary | ICD-10-CM

## 2025-08-21 DIAGNOSIS — L50.9 URTICARIA: ICD-10-CM

## 2025-08-21 RX ORDER — DEXAMETHASONE SODIUM PHOSPHATE 10 MG/ML
10 INJECTION, SOLUTION INTRAMUSCULAR; INTRAVENOUS ONCE
Status: DISCONTINUED | OUTPATIENT
Start: 2025-08-21 | End: 2025-08-21

## 2025-08-21 RX ORDER — ALBUTEROL SULFATE 0.83 MG/ML
2.5 SOLUTION RESPIRATORY (INHALATION) ONCE
Status: COMPLETED | OUTPATIENT
Start: 2025-08-21 | End: 2025-08-21

## 2025-08-21 RX ORDER — DIPHENHYDRAMINE HYDROCHLORIDE 50 MG/ML
50 INJECTION, SOLUTION INTRAMUSCULAR; INTRAVENOUS ONCE
Status: COMPLETED | OUTPATIENT
Start: 2025-08-21 | End: 2025-08-21

## 2025-08-21 RX ORDER — DEXAMETHASONE SODIUM PHOSPHATE 10 MG/ML
10 INJECTION, SOLUTION INTRAMUSCULAR; INTRAVENOUS ONCE
Status: COMPLETED | OUTPATIENT
Start: 2025-08-21 | End: 2025-08-21

## 2025-08-21 RX ADMIN — DIPHENHYDRAMINE HYDROCHLORIDE 50 MG: 50 INJECTION, SOLUTION INTRAMUSCULAR; INTRAVENOUS at 19:04

## 2025-08-21 RX ADMIN — DEXAMETHASONE SODIUM PHOSPHATE 10 MG: 10 INJECTION, SOLUTION INTRAMUSCULAR; INTRAVENOUS at 19:03

## 2025-08-21 RX ADMIN — ALBUTEROL SULFATE 2.5 MG: 0.83 SOLUTION RESPIRATORY (INHALATION) at 19:04

## 2025-08-23 VITALS
RESPIRATION RATE: 15 BRPM | HEART RATE: 77 BPM | HEIGHT: 64 IN | DIASTOLIC BLOOD PRESSURE: 74 MMHG | OXYGEN SATURATION: 94 % | WEIGHT: 101 LBS | TEMPERATURE: 98.5 F | BODY MASS INDEX: 17.24 KG/M2 | SYSTOLIC BLOOD PRESSURE: 111 MMHG

## 2025-08-23 ASSESSMENT — ENCOUNTER SYMPTOMS
HYPERVENTILATION: 0
WHEEZING: 1
ALLERGIC REACTION: 1
RHINORRHEA: 0
PHOTOPHOBIA: 0
SORE THROAT: 0
DIARRHEA: 0
GLOBUS SENSATION: 0
COUGH: 0
DIFFICULTY BREATHING: 1
NAUSEA: 0
ABDOMINAL PAIN: 0
VOMITING: 0
TROUBLE SWALLOWING: 0
SHORTNESS OF BREATH: 0
STRIDOR: 0
EYE ITCHING: 1
EYE REDNESS: 0
EYE WATERING: 1

## 2025-09-04 DIAGNOSIS — G44.89 OTHER HEADACHE SYNDROME: ICD-10-CM

## 2025-09-05 RX ORDER — IBUPROFEN 800 MG/1
TABLET, FILM COATED ORAL
Qty: 20 TABLET | Refills: 0 | Status: SHIPPED | OUTPATIENT
Start: 2025-09-05